# Patient Record
Sex: MALE | Race: WHITE | Employment: OTHER | URBAN - METROPOLITAN AREA
[De-identification: names, ages, dates, MRNs, and addresses within clinical notes are randomized per-mention and may not be internally consistent; named-entity substitution may affect disease eponyms.]

---

## 2021-10-11 ENCOUNTER — HOSPITAL ENCOUNTER (EMERGENCY)
Facility: CLINIC | Age: 76
Discharge: HOME | End: 2021-10-11
Attending: EMERGENCY MEDICINE | Admitting: EMERGENCY MEDICINE
Payer: MEDICARE

## 2021-10-11 VITALS
RESPIRATION RATE: 22 BRPM | DIASTOLIC BLOOD PRESSURE: 94 MMHG | BODY MASS INDEX: 24.91 KG/M2 | HEIGHT: 66 IN | TEMPERATURE: 97.1 F | SYSTOLIC BLOOD PRESSURE: 164 MMHG | HEART RATE: 80 BPM | OXYGEN SATURATION: 96 % | WEIGHT: 155 LBS

## 2021-10-11 DIAGNOSIS — R00.0 TACHYCARDIA: ICD-10-CM

## 2021-10-11 DIAGNOSIS — S41.111A LACERATION OF RIGHT UPPER EXTREMITY, INITIAL ENCOUNTER: ICD-10-CM

## 2021-10-11 LAB
ATRIAL RATE - MUSE: 120 BPM
DIASTOLIC BLOOD PRESSURE - MUSE: NORMAL MMHG
INTERPRETATION ECG - MUSE: NORMAL
P AXIS - MUSE: NORMAL DEGREES
PR INTERVAL - MUSE: NORMAL MS
QRS DURATION - MUSE: 158 MS
QT - MUSE: 352 MS
QTC - MUSE: 518 MS
R AXIS - MUSE: 12 DEGREES
SYSTOLIC BLOOD PRESSURE - MUSE: NORMAL MMHG
T AXIS - MUSE: 186 DEGREES
VENTRICULAR RATE- MUSE: 130 BPM

## 2021-10-11 PROCEDURE — 12001 RPR S/N/AX/GEN/TRNK 2.5CM/<: CPT

## 2021-10-11 PROCEDURE — 250N000009 HC RX 250: Performed by: EMERGENCY MEDICINE

## 2021-10-11 PROCEDURE — 93005 ELECTROCARDIOGRAM TRACING: CPT | Mod: 59

## 2021-10-11 PROCEDURE — 99283 EMERGENCY DEPT VISIT LOW MDM: CPT

## 2021-10-11 PROCEDURE — 250N000011 HC RX IP 250 OP 636: Performed by: EMERGENCY MEDICINE

## 2021-10-11 PROCEDURE — 271N000002 HC RX 271: Performed by: EMERGENCY MEDICINE

## 2021-10-11 RX ORDER — METHYLCELLULOSE 4000CPS 30 %
POWDER (GRAM) MISCELLANEOUS ONCE
Status: COMPLETED | OUTPATIENT
Start: 2021-10-11 | End: 2021-10-11

## 2021-10-11 RX ADMIN — Medication 150 MG: at 03:24

## 2021-10-11 RX ADMIN — EPINEPHRINE BITARTRATE 3 ML: 1 POWDER at 03:24

## 2021-10-11 ASSESSMENT — ENCOUNTER SYMPTOMS
PALPITATIONS: 1
WOUND: 1

## 2021-10-11 ASSESSMENT — MIFFLIN-ST. JEOR: SCORE: 1385.83

## 2021-10-11 NOTE — DISCHARGE INSTRUCTIONS
Your heart rate was much too fast in the emergency department today.  I am concerned that with your cardiac history allowing your heart rate to stay this fast could cause heart damage.  We do not know why your heart is beating this quickly right now and the cause could be something dangerous.  We strongly recommended that you stay in the hospital for further evaluation and testing.  Not treating your fast heart rate or the underlying cause could lead to permanent disability or death.  I strongly encourage you to return to the emergency department when you are able for further evaluation of your heart rate.  We are happy to see you at this emergency department at any time.    If you feel your condition has changed or worsened, please call your doctor or return to the emergency department right away.        Discharge Instructions  Laceration (Cut)    You were seen today for a laceration (cut).  Your provider examined your laceration for any problems such a buried foreign body (like glass, a splinter, or gravel), or injury to blood vessels, tendons, and nerves.  Your provider may have also rinsed and/or scrubbed your laceration to help prevent an infection. It may not be possible to find all problems with your laceration on the first visit; occasionally foreign bodies or a tendon injury can go undetected.    Your laceration may have been closed in one of several ways:  No closure: many wounds will heal just fine without closure.  Stitches: regular stitches that require removal.  Staples: skin staples are often used in the scalp/head.  Wound adhesive (glue): skin glue can be used for certain lacerations and doesn t require removal.  Wound strips (aka Butterfly bandages or steri-strips): these are bandages that help to close a wound.  Absorbable stitches:  dissolving  stitches that go away on their own and usually don t require removal.    A small percentage of wounds will develop an infection regardless of how well the  wound is cared for. Antibiotics are generally not indicated to prevent an infection so are only given for a small number of high-risk wounds. Some lacerations are too high risk to close, and are left open to heal because closure can increase the likelihood that an infection will develop.    Remember that all lacerations, no matter how expertly repaired, will cause scarring. We consider many factors, techniques, and materials, in our efforts to provide the best possible cosmetic outcome.    Generally, every Emergency Department visit should have a follow-up clinic visit with either a primary or a specialty clinic/provider. Please follow-up as instructed by your emergency provider today.     Return to the Emergency Department right away if:  You have more redness, swelling, pain, drainage (pus), a bad smell, or red streaking from your laceration as these symptoms could indicate an infection.  You have a fever of 100.4 F or more.  You have bleeding that you cannot stop at home. If your cut starts to bleed, hold pressure on the bleeding area with a clean cloth or put pressure over the bandage.  If the bleeding does not stop after using constant pressure for 30 minutes, you should return to the Emergency Department for further treatment.  An area past the laceration is cool, pale, or blue compared with the other side, or has a slower return of color when squeezed.  Your dressing seems too tight or starts to get uncomfortable or painful. For children, signs of a problem might be irritability or restlessness.  You have loss of normal function or use of an area, such as being unable to straighten or bend a finger normally.  You have a numb area past the laceration.    Return to the Emergency Department or see your regular provider if:  The laceration starts to come open.   You have something coming out of the cut or a feeling that there is something in the laceration.  Your wound will not heal, or keeps breaking open. There  can always be glass, wood, dirt or other things in any wound.  They will not always show up, even on x-rays.  If a wound does not heal, this may be why, and it is important to follow-up with your regular provider.    Home Care:  Take your dressing off in 12-24 hours, or as instructed by your provider, to check your laceration. Remove the dressing sooner if it seems too tight or painful, or if it is getting numb, tingly, or pale past the dressing.  Gently wash your laceration 1-2 times daily with clean water and mild soap. It is okay to shower or run clean water over the laceration, but do not let the laceration soak in water (no swimming).  If your laceration was closed with wound adhesive or strips: pat it dry and leave it open to the air. For all other repairs: after you wash your laceration, or at least 2 times a day, apply antibiotic ointment (such as Neosporin  or Bacitracin ) to the laceration, then cover it with a Band-Aid  or gauze.  Keep the laceration clean. Wear gloves or other protective clothing if you are around dirt.    Follow-up for removal:  If your wound was closed with staples or regular stitches, they need to be removed according to the instructions and timeline specified by your provider today.  If your wound was closed with absorbable ( dissolving ) sutures, they should fall out, dissolve, or not be visible in about one week. If they are still visible, then they should be removed according to the instructions and timeline specified by your provider today.    Scars:  To help minimize scarring:  Wear sunscreen over the healed laceration when out in the sun.  Massage the area regularly once healed.  You may apply Vitamin E to the healed wound.  Wait. Scars improve in appearance over months and years.    If you were given a prescription for medicine here today, be sure to read all of the information (including the package insert) that comes with your prescription.  This will include important  information about the medicine, its side effects, and any warnings that you need to know about.  The pharmacist who fills the prescription can provide more information and answer questions you may have about the medicine.  If you have questions or concerns that the pharmacist cannot address, please call or return to the Emergency Department.       Remember that you can always come back to the Emergency Department if you are not able to see your regular provider in the amount of time listed above, if you get any new symptoms, or if there is anything that worries you.  Discharge Instructions  Palpitations    Palpitations are an unusual awareness of your heartbeat. People often describe this as the heart skipping, fluttering, racing, irregular, or pounding. At this time, your provider has found no signs that your palpitations are due to a serious or life-threatening condition. However, sometimes there is a serious problem that does not show up right away.    Palpitations can be caused by caffeine, cigarettes, diet pills, energy drinks or supplements, other stimulants, and medications and street drugs. They can also be caused by anxiety, hormone conditions such as high thyroid, and other medical conditions. Sometimes they are a sign of abnormal rhythm in the heart. At this time, your provider did not find any dangerous cause of your symptoms.    Generally, every Emergency Department visit should have a follow-up clinic visit with either a primary or a specialty clinic/provider. Please follow-up as instructed by your emergency provider today.    Return to the Emergency Department if:  You get chest pain or tightness.  You are short of breath.  You get very weak or tired.  You pass out or faint.  Your heart rate is over 120 beats per minute for more than 10 minutes while you are resting.   You have anything else that worries you.    What can I do to help myself?  Fill any prescriptions the provider gave you and take them  right away.   Follow your provider s instructions about the prescription medicines you are on. Sometimes the provider may tell you to stop taking a medicine or change the dose.  If you smoke, this may be a good time to quit! The less you can smoke, the better.  Do not use energy drinks, diet pills, or stimulants. Limit your use of caffeine.  If you were given a prescription for medicine here today, be sure to read all of the information (including the package insert) that comes with your prescription.  This will include important information about the medicine, its side effects, and any warnings that you need to know about.  The pharmacist who fills the prescription can provide more information and answer questions you may have about the medicine.  If you have questions or concerns that the pharmacist cannot address, please call or return to the Emergency Department.     Remember that you can always come back to the Emergency Department if you are not able to see your regular provider in the amount of time listed above, if you get any new symptoms, or if there is anything that worries you.

## 2021-10-11 NOTE — ED TRIAGE NOTES
Patient states feel into a mirror.  Cut right arm.  States thinks he cut his artery.  Takes blood thinner.

## 2021-10-11 NOTE — ED NOTES
"Patient insisted on leaving AMA after receiving sutures. Declined blood test and further workup. \"I have to get home to my wife and my dog is having surgery today that we have waited two months for.\" Patient signed AMA form. Instructed to return to ED if his condition worsens in anyway.   "

## 2021-10-11 NOTE — ED PROVIDER NOTES
"  History   Chief Complaint:  Laceration       The history is provided by the patient.      Xavier Giordano is a 76 year old male with history of coronary artery disease who presents for evaluation of a right forearm laceration. Xavier lacerated his right forearm on his wife's metal nightstand prior to presentation. He is anticoagulated on Plavix for coronary artery disease. On my evaluation he is tachycardic to 130 and reports his heart rate is normally 60. No history of atrial fibrillation.     Review of Systems   Cardiovascular: Positive for palpitations.   Skin: Positive for wound.   10 systems reviewed and negative except as above and in HPI.     Allergies:  Amlodipine  Atenolol  Carvedilol  Ciprofloxacin  Metoprolol  Metronidazole  Morphine  Simvastatin    Medications:  Ascorbic acid  Aspirin 81 mg  Cholecalciferol   Gemfibrozil  Clopidogrel  Nitroglycerin  Isosorbide mononitrate  Lisinopril-hydrochlorothiazide    Past Medical History:     AAA  Chronic pain  Coronary artery disease  Cellulitis left lower extremity  Hyperlipidemia   Hypertension   PAD  Vitamin D deficiency    Past Surgical History:    CABG x4  Hernia repair x2  Coronary stent placemen  Arthroscopy knee surgery  Left toe amputation     Family History:    Diabetes    Social History:  Presents unaccompanied  Lives independently with his wife     Physical Exam     Patient Vitals for the past 24 hrs:   BP Temp Temp src Pulse Resp SpO2 Height Weight   10/11/21 0348 -- -- -- 80 -- -- -- --   10/11/21 0205 (!) 164/94 97.1  F (36.2  C) Temporal (!) 134 22 96 % 1.676 m (5' 6\") 70.3 kg (155 lb)       Physical Exam  General: Resting on the gurney, appears uncomfortable  Head:  The scalp, face, and head appear normal  Mouth/Throat: Mucus membranes are moist  CV:  Rapid but regular rate    Normal S1 and S2  No pathological murmur   Resp:  Breath sounds clear and equal bilaterally    Non-labored, no retractions or accessory muscle use    No coarseness    No " wheezing   GI:  Abdomen is soft, no rigidity    No tenderness to palpation  MS:  Normal motor assessment of all extremities.    Good capillary refill noted.  Skin:  2 cm V-shaped laceration to the right forearm.  Neuro:  Speech is normal and fluent. No apparent deficit.  Psych : Awake. Alert.  Normal affect.      Appropriate interactions.     Emergency Department Course   ECG  ECG obtained at 0427, ECG read at 0427  Wide QRS tachycardia. Left bundle branch block. Abnormal ECG.   No prior ECG for comparison.   Rate 130 bpm. AR interval * ms. QRS duration 158 ms. QT/QTc 352/518 ms. P-R-T axes * 12 186.       Procedures    Laceration Repair        LACERATION:  A simple clean 2 cm V-shaped laceration.      LOCATION:  Right forearm      FUNCTION:  Distally sensation, circulation, motor and tendon function are intact.      ANESTHESIA:  Local using 1% lidocaine w/o epi and LET - Topical      PREPARATION:  Irrigation with Normal Saline      DEBRIDEMENT:  no debridement      CLOSURE:  Wound was closed with One Layer.  Skin closed with 3 x 5.0 Ethylon using interrupted sutures.      Emergency Department Course:  Reviewed:  I reviewed nursing notes, vitals, past medical history and Care Everywhere    Assessments:  0415 I obtained history and examined the patient as noted above. He is insistent on discharge immediately.     Disposition:  The patient left AMA.     Impression & Plan     Medical Decision Making:  The patient presented with a laceration.  The wound was carefully evaluated and explored.  The laceration was closed with sutures as noted above.  There is no evidence of muscular, tendon, or bony damage with this laceration.  No signs of foreign body.    He was found to be tachycardic while here in the emergency department.  Etiology for his tachycardia is not clear.  I strongly recommended further assessment which the patient adamantly refused.  I recommended at a minimum obtaining i-STAT labs to evaluate for anemia or  elevated troponin.  Patient again refused these.  He insisted on immediate discharge.  I discussed at length that he could have death or permanent disability and that following up at a later date could cause permanent harm or death.  He is insistent on discharge and is discharged AGAINST MEDICAL ADVICE per his request.   Follow up with primary care will be indicated for suture removal as noted in the discharge section.       Diagnosis:    ICD-10-CM    1. Tachycardia  R00.0    2. Laceration of right upper extremity, initial encounter  S41.111A        Scribe Disclosure:  Jennifer CADENA, am serving as a scribe at 4:15 AM on 10/11/2021 to document services personally performed by Terra Cunningham MD based on my observations and the provider's statements to me.              Terra Cunningham MD  10/11/21 0368

## 2021-10-19 ENCOUNTER — TRANSCRIBE ORDERS (OUTPATIENT)
Dept: OTHER | Age: 76
End: 2021-10-19

## 2021-10-19 DIAGNOSIS — Z98.61 POST PTCA: ICD-10-CM

## 2021-10-19 DIAGNOSIS — I24.9 ACS (ACUTE CORONARY SYNDROME) (H): Primary | ICD-10-CM

## 2021-10-24 ENCOUNTER — APPOINTMENT (OUTPATIENT)
Dept: GENERAL RADIOLOGY | Facility: CLINIC | Age: 76
DRG: 280 | End: 2021-10-24
Attending: EMERGENCY MEDICINE
Payer: MEDICARE

## 2021-10-24 ENCOUNTER — HOSPITAL ENCOUNTER (INPATIENT)
Facility: CLINIC | Age: 76
LOS: 4 days | Discharge: HOME | DRG: 280 | End: 2021-10-28
Attending: EMERGENCY MEDICINE | Admitting: INTERNAL MEDICINE
Payer: MEDICARE

## 2021-10-24 DIAGNOSIS — E87.6 HYPOKALEMIA: ICD-10-CM

## 2021-10-24 DIAGNOSIS — I21.4 NSTEMI (NON-ST ELEVATED MYOCARDIAL INFARCTION) (H): Primary | ICD-10-CM

## 2021-10-24 DIAGNOSIS — N28.9 RENAL INSUFFICIENCY: ICD-10-CM

## 2021-10-24 DIAGNOSIS — R60.0 PERIPHERAL EDEMA: ICD-10-CM

## 2021-10-24 DIAGNOSIS — R79.89 ELEVATED TROPONIN: ICD-10-CM

## 2021-10-24 DIAGNOSIS — I50.31 ACUTE DIASTOLIC HEART FAILURE (H): ICD-10-CM

## 2021-10-24 DIAGNOSIS — I47.29 PAROXYSMAL VENTRICULAR TACHYCARDIA (H): ICD-10-CM

## 2021-10-24 LAB
ALBUMIN SERPL-MCNC: 3.2 G/DL (ref 3.4–5)
ALP SERPL-CCNC: 135 U/L (ref 40–150)
ALT SERPL W P-5'-P-CCNC: 83 U/L (ref 0–70)
ANION GAP SERPL CALCULATED.3IONS-SCNC: 10 MMOL/L (ref 3–14)
AST SERPL W P-5'-P-CCNC: 57 U/L (ref 0–45)
ATRIAL RATE - MUSE: 76 BPM
ATRIAL RATE - MUSE: 96 BPM
BASOPHILS # BLD AUTO: 0.1 10E3/UL (ref 0–0.2)
BASOPHILS NFR BLD AUTO: 1 %
BILIRUB SERPL-MCNC: 0.9 MG/DL (ref 0.2–1.3)
BUN SERPL-MCNC: 43 MG/DL (ref 7–30)
CALCIUM SERPL-MCNC: 8.4 MG/DL (ref 8.5–10.1)
CHLORIDE BLD-SCNC: 101 MMOL/L (ref 94–109)
CO2 SERPL-SCNC: 21 MMOL/L (ref 20–32)
CREAT SERPL-MCNC: 2.04 MG/DL (ref 0.66–1.25)
DIASTOLIC BLOOD PRESSURE - MUSE: NORMAL MMHG
DIASTOLIC BLOOD PRESSURE - MUSE: NORMAL MMHG
EOSINOPHIL # BLD AUTO: 0.1 10E3/UL (ref 0–0.7)
EOSINOPHIL NFR BLD AUTO: 1 %
ERYTHROCYTE [DISTWIDTH] IN BLOOD BY AUTOMATED COUNT: 15.3 % (ref 10–15)
GFR SERPL CREATININE-BSD FRML MDRD: 31 ML/MIN/1.73M2
GLUCOSE BLD-MCNC: 122 MG/DL (ref 70–99)
HCT VFR BLD AUTO: 26 % (ref 40–53)
HGB BLD-MCNC: 8.5 G/DL (ref 13.3–17.7)
HOLD SPECIMEN: NORMAL
IMM GRANULOCYTES # BLD: 0.1 10E3/UL
IMM GRANULOCYTES NFR BLD: 1 %
INR PPP: 1.32 (ref 0.85–1.15)
INTERPRETATION ECG - MUSE: NORMAL
INTERPRETATION ECG - MUSE: NORMAL
LYMPHOCYTES # BLD AUTO: 1.4 10E3/UL (ref 0.8–5.3)
LYMPHOCYTES NFR BLD AUTO: 15 %
MAGNESIUM SERPL-MCNC: 2.4 MG/DL (ref 1.6–2.3)
MAGNESIUM SERPL-MCNC: 3 MG/DL (ref 1.6–2.3)
MCH RBC QN AUTO: 30.6 PG (ref 26.5–33)
MCHC RBC AUTO-ENTMCNC: 32.7 G/DL (ref 31.5–36.5)
MCV RBC AUTO: 94 FL (ref 78–100)
MONOCYTES # BLD AUTO: 0.9 10E3/UL (ref 0–1.3)
MONOCYTES NFR BLD AUTO: 9 %
NEUTROPHILS # BLD AUTO: 6.9 10E3/UL (ref 1.6–8.3)
NEUTROPHILS NFR BLD AUTO: 73 %
NRBC # BLD AUTO: 0 10E3/UL
NRBC BLD AUTO-RTO: 0 /100
NT-PROBNP SERPL-MCNC: ABNORMAL PG/ML (ref 0–900)
P AXIS - MUSE: NORMAL DEGREES
P AXIS - MUSE: NORMAL DEGREES
PLATELET # BLD AUTO: 272 10E3/UL (ref 150–450)
POTASSIUM BLD-SCNC: 3.3 MMOL/L (ref 3.4–5.3)
POTASSIUM BLD-SCNC: 4.8 MMOL/L (ref 3.4–5.3)
PR INTERVAL - MUSE: NORMAL MS
PR INTERVAL - MUSE: NORMAL MS
PROT SERPL-MCNC: 7.3 G/DL (ref 6.8–8.8)
QRS DURATION - MUSE: 142 MS
QRS DURATION - MUSE: 152 MS
QT - MUSE: 356 MS
QT - MUSE: 384 MS
QTC - MUSE: 526 MS
QTC - MUSE: 573 MS
R AXIS - MUSE: 262 DEGREES
R AXIS - MUSE: 28 DEGREES
RBC # BLD AUTO: 2.78 10E6/UL (ref 4.4–5.9)
SARS-COV-2 RNA RESP QL NAA+PROBE: NEGATIVE
SODIUM SERPL-SCNC: 132 MMOL/L (ref 133–144)
SYSTOLIC BLOOD PRESSURE - MUSE: NORMAL MMHG
SYSTOLIC BLOOD PRESSURE - MUSE: NORMAL MMHG
T AXIS - MUSE: 179 DEGREES
T AXIS - MUSE: 74 DEGREES
TROPONIN I SERPL-MCNC: 0.23 UG/L (ref 0–0.04)
TROPONIN I SERPL-MCNC: 1.93 UG/L (ref 0–0.04)
TROPONIN I SERPL-MCNC: 3.46 UG/L (ref 0–0.04)
TSH SERPL DL<=0.005 MIU/L-ACNC: 1.66 MU/L (ref 0.4–4)
UFH PPP CHRO-ACNC: 0.45 IU/ML
VENTRICULAR RATE- MUSE: 113 BPM
VENTRICULAR RATE- MUSE: 156 BPM
WBC # BLD AUTO: 9.5 10E3/UL (ref 4–11)

## 2021-10-24 PROCEDURE — C9803 HOPD COVID-19 SPEC COLLECT: HCPCS

## 2021-10-24 PROCEDURE — 258N000003 HC RX IP 258 OP 636: Performed by: INTERNAL MEDICINE

## 2021-10-24 PROCEDURE — 96368 THER/DIAG CONCURRENT INF: CPT

## 2021-10-24 PROCEDURE — 36415 COLL VENOUS BLD VENIPUNCTURE: CPT | Performed by: EMERGENCY MEDICINE

## 2021-10-24 PROCEDURE — 85025 COMPLETE CBC W/AUTO DIFF WBC: CPT | Performed by: EMERGENCY MEDICINE

## 2021-10-24 PROCEDURE — 87635 SARS-COV-2 COVID-19 AMP PRB: CPT | Performed by: EMERGENCY MEDICINE

## 2021-10-24 PROCEDURE — 83735 ASSAY OF MAGNESIUM: CPT | Performed by: INTERNAL MEDICINE

## 2021-10-24 PROCEDURE — 210N000002 HC R&B HEART CARE

## 2021-10-24 PROCEDURE — 83735 ASSAY OF MAGNESIUM: CPT | Performed by: EMERGENCY MEDICINE

## 2021-10-24 PROCEDURE — 99285 EMERGENCY DEPT VISIT HI MDM: CPT | Mod: 25

## 2021-10-24 PROCEDURE — 96365 THER/PROPH/DIAG IV INF INIT: CPT

## 2021-10-24 PROCEDURE — 99223 1ST HOSP IP/OBS HIGH 75: CPT | Mod: AI | Performed by: INTERNAL MEDICINE

## 2021-10-24 PROCEDURE — 250N000011 HC RX IP 250 OP 636: Performed by: INTERNAL MEDICINE

## 2021-10-24 PROCEDURE — 93005 ELECTROCARDIOGRAM TRACING: CPT

## 2021-10-24 PROCEDURE — 93005 ELECTROCARDIOGRAM TRACING: CPT | Mod: 76

## 2021-10-24 PROCEDURE — 250N000013 HC RX MED GY IP 250 OP 250 PS 637: Performed by: INTERNAL MEDICINE

## 2021-10-24 PROCEDURE — 36415 COLL VENOUS BLD VENIPUNCTURE: CPT | Performed by: INTERNAL MEDICINE

## 2021-10-24 PROCEDURE — 71045 X-RAY EXAM CHEST 1 VIEW: CPT

## 2021-10-24 PROCEDURE — 96366 THER/PROPH/DIAG IV INF ADDON: CPT

## 2021-10-24 PROCEDURE — 83880 ASSAY OF NATRIURETIC PEPTIDE: CPT | Performed by: EMERGENCY MEDICINE

## 2021-10-24 PROCEDURE — 84132 ASSAY OF SERUM POTASSIUM: CPT | Performed by: INTERNAL MEDICINE

## 2021-10-24 PROCEDURE — 96376 TX/PRO/DX INJ SAME DRUG ADON: CPT

## 2021-10-24 PROCEDURE — 84484 ASSAY OF TROPONIN QUANT: CPT | Performed by: INTERNAL MEDICINE

## 2021-10-24 PROCEDURE — 250N000011 HC RX IP 250 OP 636: Performed by: EMERGENCY MEDICINE

## 2021-10-24 PROCEDURE — 258N000003 HC RX IP 258 OP 636: Performed by: EMERGENCY MEDICINE

## 2021-10-24 PROCEDURE — 96367 TX/PROPH/DG ADDL SEQ IV INF: CPT

## 2021-10-24 PROCEDURE — 85520 HEPARIN ASSAY: CPT | Performed by: INTERNAL MEDICINE

## 2021-10-24 PROCEDURE — 84484 ASSAY OF TROPONIN QUANT: CPT | Performed by: EMERGENCY MEDICINE

## 2021-10-24 PROCEDURE — 96375 TX/PRO/DX INJ NEW DRUG ADDON: CPT

## 2021-10-24 PROCEDURE — 250N000013 HC RX MED GY IP 250 OP 250 PS 637: Performed by: EMERGENCY MEDICINE

## 2021-10-24 PROCEDURE — 84443 ASSAY THYROID STIM HORMONE: CPT | Performed by: EMERGENCY MEDICINE

## 2021-10-24 PROCEDURE — 80053 COMPREHEN METABOLIC PANEL: CPT | Performed by: EMERGENCY MEDICINE

## 2021-10-24 PROCEDURE — 99223 1ST HOSP IP/OBS HIGH 75: CPT | Performed by: INTERNAL MEDICINE

## 2021-10-24 PROCEDURE — 85610 PROTHROMBIN TIME: CPT | Performed by: EMERGENCY MEDICINE

## 2021-10-24 RX ORDER — PANTOPRAZOLE SODIUM 40 MG/1
40 TABLET, DELAYED RELEASE ORAL 2 TIMES DAILY
Status: DISCONTINUED | OUTPATIENT
Start: 2021-10-24 | End: 2021-10-28 | Stop reason: HOSPADM

## 2021-10-24 RX ORDER — PANTOPRAZOLE SODIUM 40 MG/1
40 TABLET, DELAYED RELEASE ORAL 2 TIMES DAILY
COMMUNITY

## 2021-10-24 RX ORDER — AMOXICILLIN 250 MG
2 CAPSULE ORAL 2 TIMES DAILY PRN
Status: DISCONTINUED | OUTPATIENT
Start: 2021-10-24 | End: 2021-10-28 | Stop reason: HOSPADM

## 2021-10-24 RX ORDER — PROCHLORPERAZINE 25 MG
12.5 SUPPOSITORY, RECTAL RECTAL EVERY 12 HOURS PRN
Status: DISCONTINUED | OUTPATIENT
Start: 2021-10-24 | End: 2021-10-28 | Stop reason: HOSPADM

## 2021-10-24 RX ORDER — AMOXICILLIN 250 MG
1 CAPSULE ORAL 2 TIMES DAILY PRN
Status: DISCONTINUED | OUTPATIENT
Start: 2021-10-24 | End: 2021-10-28 | Stop reason: HOSPADM

## 2021-10-24 RX ORDER — BISACODYL 10 MG
10 SUPPOSITORY, RECTAL RECTAL DAILY PRN
Status: DISCONTINUED | OUTPATIENT
Start: 2021-10-24 | End: 2021-10-28 | Stop reason: HOSPADM

## 2021-10-24 RX ORDER — FAMOTIDINE 20 MG
2000 TABLET ORAL DAILY
COMMUNITY

## 2021-10-24 RX ORDER — FUROSEMIDE 10 MG/ML
80 INJECTION INTRAMUSCULAR; INTRAVENOUS EVERY 8 HOURS
Status: DISCONTINUED | OUTPATIENT
Start: 2021-10-24 | End: 2021-10-25

## 2021-10-24 RX ORDER — ACETAMINOPHEN 325 MG/1
650 TABLET ORAL EVERY 6 HOURS PRN
Status: DISCONTINUED | OUTPATIENT
Start: 2021-10-24 | End: 2021-10-28 | Stop reason: HOSPADM

## 2021-10-24 RX ORDER — POTASSIUM CHLORIDE 7.45 MG/ML
10 INJECTION INTRAVENOUS ONCE
Status: DISCONTINUED | OUTPATIENT
Start: 2021-10-24 | End: 2021-10-24

## 2021-10-24 RX ORDER — HEPARIN SODIUM 10000 [USP'U]/100ML
0-5000 INJECTION, SOLUTION INTRAVENOUS CONTINUOUS
Status: DISCONTINUED | OUTPATIENT
Start: 2021-10-24 | End: 2021-10-25

## 2021-10-24 RX ORDER — ACETAMINOPHEN 650 MG/1
650 SUPPOSITORY RECTAL EVERY 6 HOURS PRN
Status: DISCONTINUED | OUTPATIENT
Start: 2021-10-24 | End: 2021-10-28 | Stop reason: HOSPADM

## 2021-10-24 RX ORDER — MAGNESIUM SULFATE HEPTAHYDRATE 40 MG/ML
2 INJECTION, SOLUTION INTRAVENOUS ONCE
Status: COMPLETED | OUTPATIENT
Start: 2021-10-24 | End: 2021-10-24

## 2021-10-24 RX ORDER — MULTIPLE VITAMINS W/ MINERALS TAB 9MG-400MCG
1 TAB ORAL DAILY
COMMUNITY

## 2021-10-24 RX ORDER — METOPROLOL SUCCINATE 50 MG/1
50 TABLET, EXTENDED RELEASE ORAL 2 TIMES DAILY
COMMUNITY

## 2021-10-24 RX ORDER — FUROSEMIDE 10 MG/ML
80 INJECTION INTRAMUSCULAR; INTRAVENOUS EVERY 8 HOURS
Status: DISCONTINUED | OUTPATIENT
Start: 2021-10-24 | End: 2021-10-24

## 2021-10-24 RX ORDER — ASPIRIN 81 MG/1
81 TABLET ORAL DAILY
COMMUNITY

## 2021-10-24 RX ORDER — ROSUVASTATIN CALCIUM 40 MG/1
40 TABLET, COATED ORAL AT BEDTIME
Status: ON HOLD | COMMUNITY
End: 2021-10-28

## 2021-10-24 RX ORDER — POLYETHYLENE GLYCOL 3350 17 G/17G
17 POWDER, FOR SOLUTION ORAL DAILY PRN
Status: DISCONTINUED | OUTPATIENT
Start: 2021-10-24 | End: 2021-10-28 | Stop reason: HOSPADM

## 2021-10-24 RX ORDER — ACETAMINOPHEN 325 MG/1
650 TABLET ORAL EVERY 4 HOURS PRN
COMMUNITY

## 2021-10-24 RX ORDER — HYDRALAZINE HYDROCHLORIDE 20 MG/ML
10 INJECTION INTRAMUSCULAR; INTRAVENOUS EVERY 4 HOURS PRN
Status: DISCONTINUED | OUTPATIENT
Start: 2021-10-24 | End: 2021-10-28 | Stop reason: HOSPADM

## 2021-10-24 RX ORDER — ONDANSETRON 4 MG/1
4 TABLET, ORALLY DISINTEGRATING ORAL EVERY 6 HOURS PRN
Status: DISCONTINUED | OUTPATIENT
Start: 2021-10-24 | End: 2021-10-28 | Stop reason: HOSPADM

## 2021-10-24 RX ORDER — HEPARIN SODIUM 10000 [USP'U]/100ML
0-5000 INJECTION, SOLUTION INTRAVENOUS CONTINUOUS
Status: DISCONTINUED | OUTPATIENT
Start: 2021-10-24 | End: 2021-10-24

## 2021-10-24 RX ORDER — FUROSEMIDE 40 MG
40 TABLET ORAL 2 TIMES DAILY
COMMUNITY

## 2021-10-24 RX ORDER — FUROSEMIDE 10 MG/ML
40 INJECTION INTRAMUSCULAR; INTRAVENOUS ONCE
Status: COMPLETED | OUTPATIENT
Start: 2021-10-24 | End: 2021-10-24

## 2021-10-24 RX ORDER — NITROGLYCERIN 0.4 MG/1
0.4 TABLET SUBLINGUAL EVERY 5 MIN PRN
COMMUNITY

## 2021-10-24 RX ORDER — CLOPIDOGREL BISULFATE 75 MG/1
75 TABLET ORAL DAILY
Status: DISCONTINUED | OUTPATIENT
Start: 2021-10-25 | End: 2021-10-28 | Stop reason: HOSPADM

## 2021-10-24 RX ORDER — ROSUVASTATIN CALCIUM 20 MG/1
40 TABLET, COATED ORAL AT BEDTIME
Status: DISCONTINUED | OUTPATIENT
Start: 2021-10-24 | End: 2021-10-28 | Stop reason: HOSPADM

## 2021-10-24 RX ORDER — ISOSORBIDE MONONITRATE 30 MG/1
30 TABLET, EXTENDED RELEASE ORAL AT BEDTIME
Status: DISCONTINUED | OUTPATIENT
Start: 2021-10-24 | End: 2021-10-28 | Stop reason: HOSPADM

## 2021-10-24 RX ORDER — CLOPIDOGREL BISULFATE 75 MG/1
75 TABLET ORAL DAILY
COMMUNITY

## 2021-10-24 RX ORDER — MULTIVIT WITH MINERALS/LUTEIN
1000 TABLET ORAL DAILY
COMMUNITY

## 2021-10-24 RX ORDER — ISOSORBIDE MONONITRATE 60 MG/1
30 TABLET, EXTENDED RELEASE ORAL AT BEDTIME
COMMUNITY

## 2021-10-24 RX ORDER — LIDOCAINE 40 MG/G
CREAM TOPICAL
Status: DISCONTINUED | OUTPATIENT
Start: 2021-10-24 | End: 2021-10-28 | Stop reason: HOSPADM

## 2021-10-24 RX ORDER — ASPIRIN 81 MG/1
81 TABLET ORAL DAILY
Status: DISCONTINUED | OUTPATIENT
Start: 2021-10-25 | End: 2021-10-28 | Stop reason: HOSPADM

## 2021-10-24 RX ORDER — ONDANSETRON 2 MG/ML
4 INJECTION INTRAMUSCULAR; INTRAVENOUS EVERY 6 HOURS PRN
Status: DISCONTINUED | OUTPATIENT
Start: 2021-10-24 | End: 2021-10-28 | Stop reason: HOSPADM

## 2021-10-24 RX ORDER — PROCHLORPERAZINE MALEATE 5 MG
5 TABLET ORAL EVERY 6 HOURS PRN
Status: DISCONTINUED | OUTPATIENT
Start: 2021-10-24 | End: 2021-10-28 | Stop reason: HOSPADM

## 2021-10-24 RX ORDER — POTASSIUM CHLORIDE 1.5 G/1.58G
40 POWDER, FOR SOLUTION ORAL ONCE
Status: COMPLETED | OUTPATIENT
Start: 2021-10-24 | End: 2021-10-24

## 2021-10-24 RX ORDER — METOPROLOL SUCCINATE 50 MG/1
50 TABLET, EXTENDED RELEASE ORAL 2 TIMES DAILY
Status: DISCONTINUED | OUTPATIENT
Start: 2021-10-24 | End: 2021-10-28 | Stop reason: HOSPADM

## 2021-10-24 RX ADMIN — Medication 1 MG: at 23:51

## 2021-10-24 RX ADMIN — AMIODARONE HYDROCHLORIDE 1 MG/MIN: 50 INJECTION, SOLUTION INTRAVENOUS at 11:08

## 2021-10-24 RX ADMIN — DEXTROSE MONOHYDRATE 50 MG: 50 INJECTION, SOLUTION INTRAVENOUS at 17:59

## 2021-10-24 RX ADMIN — AMIODARONE HYDROCHLORIDE 150 MG: 1.5 INJECTION, SOLUTION INTRAVENOUS at 10:36

## 2021-10-24 RX ADMIN — METOPROLOL SUCCINATE 50 MG: 50 TABLET, EXTENDED RELEASE ORAL at 20:02

## 2021-10-24 RX ADMIN — POTASSIUM CHLORIDE 10 MEQ: 7.46 INJECTION, SOLUTION INTRAVENOUS at 12:07

## 2021-10-24 RX ADMIN — PANTOPRAZOLE SODIUM 40 MG: 40 TABLET, DELAYED RELEASE ORAL at 20:02

## 2021-10-24 RX ADMIN — FUROSEMIDE 40 MG: 10 INJECTION, SOLUTION INTRAVENOUS at 13:12

## 2021-10-24 RX ADMIN — FUROSEMIDE 80 MG: 10 INJECTION, SOLUTION INTRAVENOUS at 18:03

## 2021-10-24 RX ADMIN — ISOSORBIDE MONONITRATE 30 MG: 30 TABLET, EXTENDED RELEASE ORAL at 21:07

## 2021-10-24 RX ADMIN — HEPARIN SODIUM 850 UNITS/HR: 10000 INJECTION, SOLUTION INTRAVENOUS at 13:15

## 2021-10-24 RX ADMIN — POTASSIUM CHLORIDE 40 MEQ: 1.5 POWDER, FOR SOLUTION ORAL at 12:05

## 2021-10-24 RX ADMIN — ROSUVASTATIN CALCIUM 40 MG: 20 TABLET, FILM COATED ORAL at 21:07

## 2021-10-24 RX ADMIN — MAGNESIUM SULFATE HEPTAHYDRATE 2 G: 40 INJECTION, SOLUTION INTRAVENOUS at 11:11

## 2021-10-24 ASSESSMENT — ACTIVITIES OF DAILY LIVING (ADL)
ADLS_ACUITY_SCORE: 10
ADLS_ACUITY_SCORE: 6
ADLS_ACUITY_SCORE: 10
ADLS_ACUITY_SCORE: 8
ADLS_ACUITY_SCORE: 10
ADLS_ACUITY_SCORE: 6
ADLS_ACUITY_SCORE: 8

## 2021-10-24 ASSESSMENT — MIFFLIN-ST. JEOR: SCORE: 1407.6

## 2021-10-24 ASSESSMENT — ENCOUNTER SYMPTOMS: SHORTNESS OF BREATH: 1

## 2021-10-24 NOTE — ED NOTES
Bed: ST02  Expected date:   Expected time:   Means of arrival:   Comments:  443  32 m v-tach  1016

## 2021-10-24 NOTE — ED PROVIDER NOTES
History   Chief Complaint:  Chest pain and Shortness of Breath     The history is provided by the patient and the EMS personnel.      Xavier Giordano is a 76 year old male with history of CAD, NSTEMI, ACS and recent coronary stent placement and aspiration thrombectomy and angioplasty, currently on Plavix, who presents alone via EMS for evaluation of chest pain and shortness of breath with exertion that started this morning. Per EMS, patient had stents placed at [facility] on the 11th and since then, he has had ongoing leg swelling. He was seen by his primary two days ago and placed on direutics. He was experiencing chest pain this morning, prompting his call to EMS.    On EMS arrival, patient was vitally stable with blood pressure of 118/76, though EKG showed possible VTACH.     Here, patient also endorses some shortness of breath with exertion.  He states due to the swelling, he has had a difficult time sleeping. However, this morning, patient had chest pain and took a dose of nitroglycerin with no relief, thus EMS was called.    Per chart review, patient was being evaluated at [facility] on 10/11 for a laceration and found to have a wide complex tachycardic rhythm with a heart rate of 130. Further workup was recommended at that time, but patient wanted to be seen at [facility] and signed AMA. He went over to [facility] that same day and found to have an NSTEMI and was admitted with a positive troponin with runs of non-sustained tachycardia. He underwent an angiogram, results below, and was found to have CHF and placed on furosemide. He also received one unit of PRBCs for a hemoglobin on 7.6 and noted melena. Patient was not discharged on lasix due to rising creatinine (1.6), but when he had his outpatient follow up after discharge, patient was found to have leg edema and shortness of breath while laying flat. He was placed on 20 mg lasix twice daily. His follow up in cardiology increased this to 40 mg twice  a day, but patient continued to have difficulty sleeping due to edema and inability to lay flat while sleeping.     Angiogram from 10/11/21  Summary/Conclusions   PRESENTATION / INDICATIONS   * NonSTEMI   VASCULAR ACCESS   * Using ultrasound guidance and a percutaneous technique, the right common femoral artery was accessed. Ultrasound was used to confirm vessel patency, localizing needle into the lumen of the vessel. An image was saved for the medical record.   DIAGNOSTIC - CORONARY   * Multivessel coronary disease (100% proximal RCA, 100% proximal LAD, 100% mid LAD, 90% proximal  circumflex, 100% OM1)   DIAGNOSTIC - GRAFTS   * The LIMA to the LAD is patent   * 90% stenosis in the mid body of the SVG graft to the 1st diagonal   * Acute total occlusion in the proximal anastomosis of the SVG graft to the 1st obtuse marginal   INTERVENTION   * Successful aspiration thrombectomy and angioplasty of the saphenous vein graft to the 1st obtuse marginal       Review of Systems   Respiratory: Positive for shortness of breath.    Cardiovascular: Positive for chest pain.   All other systems reviewed and are negative.      Allergies:  Amlodipine  Atenolol  Carvedilol  Ciprofloxacin  Metoprolol  Metronidazole  Morphine  Simvastatin    Medications:  Aspirin 81 mg  Plavix  Nitrostat  Metoprolol  Crestor  Protonix  Imdur  Lasix    Past Medical History:     Pedal edema  NSTEMI  CAD  Hyperlipidemia  Hypertension  Abdominal aortic aneurysm    Peripheral artery disease  Chronic pain  SIADH  Hyponatremia  ACS    Past Surgical History:    CABG x4  Hernia repair x2  Coronary stent placement  Arthroscopic knee surgery   Basal cell cancer removal   Toe amputation  Aspiration thrombectomy and angioplasty    Social History:  The patient arrives to the ED via EMS.   He is a former smokert.     Physical Exam     Patient Vitals for the past 24 hrs:   BP Temp Temp src Pulse Resp SpO2   10/24/21 1100 112/78 -- -- 114 17 97 %   10/24/21 1045 93/80  -- -- 99 26 96 %   10/24/21 1035 120/81 -- -- (!) 154 28 97 %   10/24/21 1025 115/80 98.7  F (37.1  C) Temporal (!) 156 24 96 %       Physical Exam  Nursing note and vitals reviewed.  Constitutional:  Appears well-developed and well-nourished.   HENT:   Head:    Atraumatic.   Mouth/Throat:   Oropharynx is clear and moist. No oropharyngeal exudate.   Eyes:    Pupils are equal, round, and reactive to light.   Neck:    Normal range of motion. Neck supple.      No tracheal deviation present. No thyromegaly present.   Cardiovascular:  No murmur. Tachycardiac rate, regular rhythm.   Pulmonary/Chest: Breath sounds are clear and equal without wheezes or crackles.  Abdominal:   Soft. Bowel sounds are normal. Exhibits no distension and      no mass. There is no tenderness.      There is no rebound and no guarding.   Musculoskeletal:  Exhibits no edema.   Lymphadenopathy:  No cervical adenopathy.   Neurological:   Alert and oriented to person, place, and time.   Skin:    Skin is warm and dry. No rash noted. Slightly pale.     Emergency Department Course   ECG #1:  ECG obtained at 1023, ECG read at 1025 by Dr. Cortes  Ventricular tachycardia   Wide QRS tachycardia with occasional premature ventricular complexes  Right bundle branch block  Abnormal ECG  No prior EKG for comparison.  Rate 156 bpm. NM interval * ms. QRS duration 152 ms. QT/QTc 356/573 ms. P-R-T axes * 262 74.       ECG #2:  ECG obtained at 1054, ECG read at 1057 by Dr. Cortes.  Wide QRS rhythm with premature supraventricular complexes and frequent premature ventricular complexes  Left bundle branch block  Abnormal ECG  As compared to EKG dated 10/24/21 at 1023   Rate 113 bpm. NM interval *. QRS duration 142. QT/QTc 384/526. P-R-T axes * 28 179.    Imaging:  XR Chest Port:   IMPRESSION: Artifact and overlying material mildly compromise exam.     Mild interstitial prominence could be from vascular congestion, though ultimately nonspecific. Mild airway thickening.  No focal consolidation or pneumonia.     Upper normal heart size, exaggerated by technique. Sternotomy. CABG. Aortic atherosclerosis.     No pleural effusion or pneumothorax.   Report per radiology    Laboratory:  CBC: HGB 8.5 (L) o/w WNL (WBC 9.5, )    CMP:  (L), Potassium 3.3 (L), Urea Nitrogen 42 (H), Creatinine 2.04 (H), Calcium 8.4 (L), Glucose 122 (H), Ast 57 (H), Alt 83 (H), Albumin 3.2 (L), GFR Estimate 31 (L) o/w WNL    Troponin (Collected 1038): 0.228 (HH)    BNP: 19,795 (H)    Magnesium: 2.4 (H)    INR: 1.32 (H)    TSH with free T4 reflex: 1.66    Asymptomatic COVID-19 (Coronavirus) PCR by Nasopharyngeal Swab: Negative      Emergency Department Course:    1017 EMS arrived. History obtained as noted above in HPI.     1019 I performed a physical exam as noted above. Additional history obtained from patient, as noted above.     1028 I spoke with Dr. Agudelo of the Cardiology service regarding patient's presentation, findings, and plan of care.     1030 I rechecked patient.     1041 I rechecked patient.     1047 I rechecked patient. Patient appeared to converted to a narrow complex tachycardia with ventricular rate of 115.     1216 I spoke with Dr. Nye of the Hospitalist service regarding patient's presentation, findings, and plan of care, who agrees to accept patient for further care, evaluation and monitoring.      Interventions:  1036 Nextrone 150 mg IV  1108 Amiodarone 1 mg/min IV  1111 Magnesium sulfate 2 g IV   1205 Potassium chloride 40 mEq PO  1207 Potassium chloride 10 mEq IV initiated, but stopped due to intolerable pain at the IV side  1312 Lasix 40 mg IV  1314 Heparin loading dose 4200 units IV  1315 Heparin 25,000 units in 0/45% NaCl 250 mL 850 units/hr IV     Disposition:  The patient was admitted to the hospital under the care of Dr. Nye.     Impression & Plan     Medical Decision Making:  I found the patient to have stable ventricular tachycardia and he cardioverted after  amiodarone bolus and drip IV. He was also found to have hypokalemia so he was given potassium supplementation here. He had recent stent placement and has an elevated troponin so he is placed on heparin and admitted to the cardiac telemetry for further cardiac workup. There would be concern for a stent occlusion. He has no ECG evidence of ST elevation or MI at this time, although his Troponin is elevated, so he was given IV heparin bolus and drip per cardiac potocol. He also has CHF with fluid overload so he was given Lasix IV. He was also found to have stable anemia with an unchanged hemoglobin from earlier this month, a Creatinine which is elevated from his baseline, and mild hypokalemia.  He was given Lasix IV and supplemental Potassium.  He was admitted to Dr. Nye to the cardiac telemetry.     Critical Care Time: was 45 minutes for this patient excluding procedures    Diagnosis:    ICD-10-CM    1. Paroxysmal ventricular tachycardia (H)  I47.2    2. Acute diastolic heart failure (H)  I50.31    3. Peripheral edema  R60.9    4. Elevated troponin  R77.8    5. Hypokalemia  E87.6    6. Renal insufficiency  N28.9        Scribe Disclosure:  Meredith CADENA, am serving as a scribe at 10:23 AM on 10/24/2021 to document services personally performed by Poly Cortes MD based on my observations and the provider's statements to me.            Poly Cortes MD  10/24/21 2533

## 2021-10-24 NOTE — ED TRIAGE NOTES
Patient called EMs this morning for chest pain. When they arrived patient was in a regular appearing tachycardia. Atrial tach with wide complex vs VT. Patient arrived was placed on full cardiac monitors including defibrillator as precaution. Patient rating chest pain 6/10.

## 2021-10-24 NOTE — H&P
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     Paroxysmal ventricular tachycardia  NSTEMI  Acute on chronic diastolic congestive heart failure exacerbation (HFpEF)  Coronary artery disease, multivessel, with history of CABG (LIMA to LAD, SVG to 1st diagonal, SVG to OM1) s/p thrombectomy and angioplasty SVG to OM1 10/11/21, ART to SVG to OM1 10/14/21  Hypertension  Dyslipidemia   *Presents with progressive lower extremity edema, orthopnea, dyspnea on exertion.   *CXR with mild interstitial prominence suggestive of CHF, clinically consistent with symptoms  *Recently admitted 10/11-10/16/21 at [facility], with angiogram x2 and stent placement as above  *Diuresed for HFpEF during that admission, though diuretics held due to creatinine rise (though occurring following angiogram, so possibly BREANNE component). Eventually started on furosemide 20 mg daily-> increased to 40 mg BID by PCP as outpatient 10/22.   *Most recent EF 53% on echocardiogram 10/11/21  *Troponin 0.228, similar elevation seen during recent admission, though with report of chest pressure and ventricular arrhythmias new episode of ACS possible   - Continue furosemide IV 80 mg TID based on home diuretic regimen (>60 mg furosemide daily)  - Cardiology consulted  - Trend troponins  - Heparin drip for now pending troponin trend, cardiology recommendations   - Daily BMP while diuresing  - Repeat  echocardiogram   - Telemetry  - Continue amiodarone drip   - Continue prior to admission aspirin, clopidogrel, rosuvastatin, metoprolol XL     Hyponatremia  Sodium 132. 133 at time of discharge 10/18. Note made of SIADH during last admission. Clinically hypervolemic.  - Daily BMP as above    Hypokalemia  Recent diuretics likely contributing   - Monitor and replace per protocol     AST and ALT elevation  AST 57, ALT 83.  Total bilirubin normal, INR mildly elevated at 1.32.  Does not appear LFTs were checked during recent admission, historically normal 3/2021.  Concern for congestive hepatopathy in the setting of CHF.  Also was recently started on rosuvastatin, possibly contributing if not resolving with diuresis.   - Monitor LFTs, INR with diuresis. Recheck in AM.    Acute kidney injury on chronic kidney disease, stage 3  Baseline creatinine 1.4-1.6 with GFR in 40s. Creatinine 2.04 on admission. Mild SHAYE with peak of 1.72 10/13/21 during recent admission, possibly due to diuresis though timing consistent with BREANNE from angiogram as well.  Clinically hypervolemic, thus concerning for cardiorenal physiology.  - Avoid nephrotoxins  - Daily BMP with diuresis as above    Recent acute blood loss anemia  Hemoglobin 8.5 g/dl, stable from recent discharge of 8.7 g/dl.  Was seen by gastroenterology during recent admission however no EGD was recommended and cardiology had attributed his drop in hemoglobin to blood loss from access site following first angiogram.  - Monitor hemoglobin closely while on heparin drip  - CBC in the AM       Diet:   2 gram sodium restricted diet   DVT Prophylaxis: Heparin drip   Ignacio Catheter: Not present  Code Status:   DNR/DNI - Discussed with patient     Disposition Plan   Admit to inpatient. Anticipate greater than or equal to 2 midnights prior to discharge.      Entered: Sean yNe MD 10/24/2021, 2:29 PM     The patient's care was discussed with the Bedside Nurse and Patient.    Clinically  Significant Risk Factors Present on Admission         # Hyponatremia: Na = 132 mmol/L (Ref range: 133 - 144 mmol/L) on admission, will monitor as appropriate     # Coagulation Defect: INR = 1.32 (Ref range: 0.85 - 1.15) and/or PTT = N/A on admission, will monitor for bleeding  # Platelet Defect: home medication list includes an antiplatelet medication           Sean Nye MD  [facility]    ______________________________________________________________________    Chief Complaint   Lower extremity edema, shortness of breath for 1 week    History of Present Illness   Xavier Giordano is a 76 year old male who presents with the above chief complaint.    History is obtained from the patient. Recently admitted 10/11-10/16/21 at [facility] for NSTEMI, during which time he underwent angiogram x2, the first with angioplasty and thrombectomy followed by staged PCI several days later.  His course was complicated by acute kidney injury, attributed to diuresis though occurred following first angiogram.  He also had bleeding from his access site resulting in anemia, though also had reported melena and was seen by GI with no endoscopy recommended, and to monitor for clinical signs of bleeding. He reports he initially felt well for 2 days following discharge, however subsequently developed increasing bilateral lower extremity edema, progressive shortness of breath when lying flat and eventually with activity.  His primary care provider had prescribed furosemide 20 mg daily, however he had progressive symptoms and was again seen on 10/22 at which point his furosemide was increased to 40 mg twice daily.  Despite this he had progressive symptoms and thus presented to the [facility] emergency department for further evaluation.  In addition to this he reports he has had some intermittent chest pressure as well as sensation of palpitations.  He denies any cough, fevers, chills. He reports he has been  compliant with all of his medications.  He denies any melena or hematochezia.    In the Emergency Department, the patient was seen by Dr. Cortes, with whom I discussed the patient's presenting symptoms and emergency department course.  Initial vital signs were a temperature of 98.7F, , /80, RR 24, SpO2 96% on room air. Work-up included EKG with runs of wide complex tachycardia, CXR with mild interstitial prominence, potassium 3.3, troponin 0.228 . Hospitalists were contacted for admission to the hospital.     Review of Systems    Complete 10 point review of systems assessed and negative except as noted in HPI.    Past Medical History    I have reviewed this patient's medical history and updated it with pertinent information if needed.   Past Medical History:   Diagnosis Date     Abdominal aortic aneurysm (AAA) (H)      Acute diastolic congestive heart failure (H)      Anemia due to blood loss, acute      Coronary artery disease     Status post CABG 2005, LIMA to LAD, SVG to D1, OM1, Cx, distal RCA; ART to SVG OM1     HTN (hypertension)      Mixed hyperlipidemia      NSTEMI (non-ST elevated myocardial infarction) (H)      Peripheral arterial disease (H)      SIADH (syndrome of inappropriate ADH production) (H)        Past Surgical History   I have reviewed this patient's surgical history and updated it with pertinent information if needed.  Past Surgical History:   Procedure Laterality Date     AMPUTATION      Toe     BYPASS GRAFT ARTERY CORONARY       HERNIA REPAIR           Social History   I have reviewed this patient's social history and updated it with pertinent information if needed.  Social History     Tobacco Use     Smoking status: Former Smoker     Types: Cigarettes   Substance Use Topics     Alcohol use: Not Currently     Drug use: Never     Lives with his dog. His wife has dementia and is currently awaiting placement in a facility.      Family History   I have reviewed this patient's family  history and updated it with pertinent information if needed.   Family History   Problem Relation Age of Onset     Heart Disease Mother          90s of heart condition     Cancer Father          87, cancer     No Known Problems Sister      No Known Problems Brother        Prior to Admission Medications   Prior to Admission Medications   Prescriptions Last Dose Informant Patient Reported? Taking?   Vitamin D, Cholecalciferol, 25 MCG (1000 UT) CAPS 10/23/2021 at am Self Yes Yes   Sig: Take 2,000 Units by mouth daily   acetaminophen (TYLENOL) 325 MG tablet  at prn Self Yes Yes   Sig: Take 650 mg by mouth every 4 hours as needed for mild pain   aspirin 81 MG EC tablet 10/24/2021 at am Self Yes Yes   Sig: Take 81 mg by mouth daily   clopidogrel (PLAVIX) 75 MG tablet 10/24/2021 at am Self Yes Yes   Sig: Take 75 mg by mouth daily   furosemide (LASIX) 40 MG tablet 10/24/2021 at 0600 Self Yes Yes   Sig: Take 40 mg by mouth 2 times daily Take at 6 am and noon   isosorbide mononitrate (IMDUR) 60 MG 24 hr tablet 10/23/2021 at hs Self Yes Yes   Sig: Take 30 mg by mouth At Bedtime   metoprolol succinate ER (TOPROL-XL) 50 MG 24 hr tablet 10/24/2021 at am Self Yes Yes   Sig: Take 50 mg by mouth 2 times daily   multivitamin w/minerals (THERA-VIT-M) tablet 10/23/2021 at am Self Yes Yes   Sig: Take 1 tablet by mouth daily   nitroGLYcerin (NITROSTAT) 0.4 MG sublingual tablet  at prn Self Yes Yes   Sig: Place 0.4 mg under the tongue every 5 minutes as needed for chest pain For chest pain place 1 tablet under the tongue every 5 minutes for 3 doses. If symptoms persist 5 minutes after 1st dose call 911.   pantoprazole (PROTONIX) 40 MG EC tablet 10/24/2021 at am Self Yes Yes   Sig: Take 40 mg by mouth 2 times daily   rosuvastatin (CRESTOR) 40 MG tablet 10/23/2021 at hs Self Yes Yes   Sig: Take 40 mg by mouth At Bedtime   vitamin C (ASCORBIC ACID) 1000 MG TABS 10/23/2021 at am Self Yes Yes   Sig: Take 1,000 mg by mouth daily       Facility-Administered Medications: None     Allergies   Allergies   Allergen Reactions     Amlodipine Other (See Comments)     Worse leg swelling than when on coreg or metoprolol     Atenolol Other (See Comments)     Carvedilol Other (See Comments)     Sharp calf pain     Ciprofloxacin Other (See Comments)     Patient on metronidazole and ciprofloxacin at same time for diverticulitis.  Developed nausea, headache, loose stool.     Metoprolol Other (See Comments)     Leg pain, darkening toes (?atheroembolic)     Metronidazole Other (See Comments)     Patient on metronidazole and ciprofloxacin at same time for diverticulitis.  Developed nausea, headache, loose stool.     Morphine Other (See Comments)     Chills     Simvastatin Rash     Skin pealing       Physical Exam   Vital Signs: Temp: 98.7  F (37.1  C) Temp src: Temporal BP: 107/69 Pulse: (!) 126   Resp: 21 SpO2: 96 %      Weight: 0 lbs 0 oz    Constitutional: NAD  Eyes: PERRL, EOMI  HENT: Oropharynx clear, MMM  Respiratory: Clear to auscultation bilaterally, no wheezes, normal effort at rest  Cardiovascular: Irregular rhythm with tachycardic rate, 2+ bilateral lower extremity edema to above the knee   GI: Soft, non-tender, non-distended. No rebound tenderness or guarding.    Skin: Warm, dry   Neurologic: Alert. Responding to questions appropriately. Following commands.    Psychiatric: Normal affect, appropriate      Data   Data reviewed today: I reviewed all medications, new labs and imaging results over the last 24 hours. I personally reviewed the EKG tracing showing wide QRS regular tachycardia with .    Recent Labs   Lab 10/24/21  1038   WBC 9.5   HGB 8.5*   MCV 94      INR 1.32*   *   POTASSIUM 3.3*   CHLORIDE 101   CO2 21   BUN 43*   CR 2.04*   ANIONGAP 10   MATTHEW 8.4*   *   ALBUMIN 3.2*   PROTTOTAL 7.3   BILITOTAL 0.9   ALKPHOS 135   ALT 83*   AST 57*   TROPONIN 0.228*       Recent Results (from the past 24 hour(s))   XR Chest Port  1 View    Narrative    EXAM: XR CHEST PORT 1 VIEW  LOCATION: [facility]  DATE/TIME: 10/24/2021 10:40 AM    INDICATION: Chest pain. Evaluate CHF.  COMPARISON: None.      Impression    IMPRESSION: Artifact and overlying material mildly compromise exam.    Mild interstitial prominence could be from vascular congestion, though ultimately nonspecific. Mild airway thickening. No focal consolidation or pneumonia.    Upper normal heart size, exaggerated by technique. Sternotomy. CABG. Aortic atherosclerosis.    No pleural effusion or pneumothorax.

## 2021-10-24 NOTE — PLAN OF CARE
Shift Summary    Neuro: A&O  Cardiac: Episodes of VT/SVT rhythm. Otherwise SR. 2nd bolus of amio given. On Amio gtt and Heparin gtt. Electrolytes replaced.   Pulmonary: SOB. LS course. 2L NC  GI/: Voiding  Skin: Scattered bruising  Activity: A1  Pain: Generalized pains. Denies CP.

## 2021-10-24 NOTE — PHARMACY-ADMISSION MEDICATION HISTORY
Pharmacy Medication History  Admission medication history interview status for the 10/24/2021  admission is complete. See EPIC admission navigator for prior to admission medications     Location of Interview: Patient room  Medication history sources: Patient, Surescripts and Care Everywhere    Significant changes made to the medication list:  All meds added.    In the past week, patient estimated taking medication this percent of the time: greater than 90%    Additional medication history information:   Patient was recently discharged from [facility] and was very informed on his recent medication changes.     Medication reconciliation completed by provider prior to medication history? No    Time spent in this activity: 15 minutes    Prior to Admission medications    Medication Sig Last Dose Taking? Auth Provider   acetaminophen (TYLENOL) 325 MG tablet Take 650 mg by mouth every 4 hours as needed for mild pain  at prn Yes Unknown, Entered By History   aspirin 81 MG EC tablet Take 81 mg by mouth daily 10/24/2021 at am Yes Unknown, Entered By History   clopidogrel (PLAVIX) 75 MG tablet Take 75 mg by mouth daily 10/24/2021 at am Yes Unknown, Entered By History   furosemide (LASIX) 40 MG tablet Take 40 mg by mouth 2 times daily Take at 6 am and noon 10/24/2021 at 0600 Yes Unknown, Entered By History   isosorbide mononitrate (IMDUR) 60 MG 24 hr tablet Take 30 mg by mouth At Bedtime 10/23/2021 at hs Yes Unknown, Entered By History   metoprolol succinate ER (TOPROL-XL) 50 MG 24 hr tablet Take 50 mg by mouth 2 times daily 10/24/2021 at am Yes Unknown, Entered By History   multivitamin w/minerals (THERA-VIT-M) tablet Take 1 tablet by mouth daily 10/23/2021 at am Yes Unknown, Entered By History   nitroGLYcerin (NITROSTAT) 0.4 MG sublingual tablet Place 0.4 mg under the tongue every 5 minutes as needed for chest pain For chest pain place 1 tablet under the tongue every 5 minutes for 3 doses. If symptoms persist 5 minutes after 1st  dose call 911.  at prn Yes Unknown, Entered By History   pantoprazole (PROTONIX) 40 MG EC tablet Take 40 mg by mouth 2 times daily 10/24/2021 at am Yes Unknown, Entered By History   rosuvastatin (CRESTOR) 40 MG tablet Take 40 mg by mouth At Bedtime 10/23/2021 at hs Yes Unknown, Entered By History   vitamin C (ASCORBIC ACID) 1000 MG TABS Take 1,000 mg by mouth daily 10/23/2021 at am Yes Unknown, Entered By History   Vitamin D, Cholecalciferol, 25 MCG (1000 UT) CAPS Take 2,000 Units by mouth daily 10/23/2021 at am Yes Unknown, Entered By History       The information provided in this note is only as accurate as the sources available at the time of update(s)

## 2021-10-24 NOTE — CONSULTS
[facility]    Cardiology Consultation     Date of Admission:  10/24/2021    Assessment & Plan   Xavier Giordano is a 76 year old male who was admitted on 10/24/2021. I was asked to see the patient for ventricular tachycardia.    1. Ventricular tachycardia   2. NSTEMI, chest pain   3. Paroxysmal atrial fibrillation with aberrant conduction  4. Coronary artery disease with history of CABG (LIMA to LAD, SVG to first diagonal, SVG to OM1).  Native coronaries are occluded and he has significant disease in diagonal graft (outside institution was planning a staged intervention)  5. Recent PCI to OM1 vein graft on October 11, 2021 at an outside institution  6. Hypertension  7. Hyperlipidemia  8. Acute on chronic heart failure with preserved ejection fraction (repeat transthoracic echocardiogram is pending).   9. CKD stage III - acute on chronic kidney disease    The patient presented with a wide-complex tachycardia into the 150s to 160s.  This was consistent with ventricular tachycardia especially given his history.  He was given IV bolus dose of amiodarone and started on the drip.  He has since had paroxysmal episodes of ventricular tachycardia versus atrial fibrillation with aberrancy.      His baseline ECG is recorded in the system which reveals sinus rhythm and a narrow complex without any acute ischemic changes.  He currently is chest pain-free and reports that his shortness of breath is somewhat improved following IV diuresis.      On review of his ECGs he has 2 separate rhythms of wide-complex tachycardia including a normal QRS duration when in sinus and when he speeds up into the 120s with atrial fibrillation has a similar access to his sinus rhythm but has a broad QRS consistent with aberrant conduction.  The initial ECG on presentation however had a different axis is more suggestive of ventricular tachycardia.    Recommendations:      Trend troponin, continue heparin drip    Can diurese  further overnight once electrolytes have been replaced.    Continue IV amiodarone, if he has any worsening nonsustained episodes of ventricular tachycardia rebolus with 150 mg of amiodarone    Electrophysiology consultation in the a.m. can discuss with them the 2 separate rhythms including likely A. fib with aberrancy and also ventricular tachycardia (twelve-lead ECG documenting conversion is placed in the chart).    N.p.o. at midnight for coronary angiography.  We will need to transition his CODE STATUS to full code for the procedure    Aggressive potassium and magnesium replacement to keep potassium above 4, magnesium above 2    Cardiology will continue to follow and provide recommendations as necessary.    Addendum:   Coronary angiogram ordered.  I briefly discussed the procedure with the patient, but informed consent is not signed just yet.  I would like the day team to evaluate his renal function in the morning prior to proceeding with the coronary angiogram.  He does have baseline CKD.     Alfredo Agudelo MD    Code Status    No CPR- Do NOT Intubate    Reason for Consult   Reason for consult: Elevated troponin, ventricular tachycardia    Primary Care Physician   Natty Crook    Chief Complaint   Chest pain, shortness of breath    History is obtained from the patient    History of Present Illness   Xavier Giordano is a 76 year old male who presents with history obtained from the electronic medical records and from the patient.  In brief he had a recent hospital admission on October 11, 2021.  He has known history of coronary artery disease with previous CABG in 2005.  He has had several drug-eluting stents to the saphenous vein graft to the first obtuse marginal including most recently during the last hospitalization earlier this month.  She presented with chest pain and a positive troponin along with several runs of nonsustained ventricular tachycardia.  The patient also endorsed shortness of breath and  progressively worsening lower extremity swelling.  He underwent PCI on the same day of admission and had aspiration thrombectomy and angioplasty of the saphenous vein graft to the first obtuse marginal.  He subsequently had a staged PCI to the saphenous vein graft with a drug-eluting stent.    He now presents with worsening chest pain since yesterday evening along with shortness of breath.  He attempted to go to sleep but was unable to fall asleep due to his symptoms.  The remainder of his recent history as outlined above from the hospitalization.  He currently feels somewhat improved with his shortness of breath and chest discomfort.    Transthoracic echocardiogram during that hospitalization as listed below    Imaging  Echocardiogram 10/11/21  Final Impressions:  1. Normal left ventricular size, mildly increased wall thickness, low normal global systolic function, calculated EF of 53 %.  2. Inferior wall and posterior wall are abnormal.  3. Mildly enlarged left atrium.  4. The aortic valve is sclerotic, no stenosis and mild to moderate regurgitation.  5. The mitral valve is sclerotic, moderate mitral regurgitation, eccentric jet directed anteromedially suggests posterior mitral valve leaflet dysfunction.  6. Moderate tricuspid regurgitation.  7. The inferior vena cava is normal sized, respiratory size variation less than 50%.      Percutaneous coronary intervention 10/11/21  DIAGNOSTIC - CORONARY  * Multivessel coronary disease (100% proximal RCA, 100% proximal LAD, 100% mid LAD, 90% proximal circumflex, 100% OM1)  DIAGNOSTIC - GRAFTS  * The LIMA to the LAD is patent  * 90% stenosis in the mid body of the SVG graft to the 1st diagonal  * Acute total occlusion in the proximal anastomosis of the SVG graft to the 1st obtuse marginal  INTERVENTION  * Successful aspiration thrombectomy and angioplasty of the saphenous vein graft to the 1st obtuse marginal       Percutaneous coronary intervention 10/14/21  Successful  angioplasty and stenting (drug eluting) of the saphenous vein graft to the 1st obtuse marginal       Past Medical History   I have reviewed this patient's medical history and updated it with pertinent information if needed.   Past Medical History:   Diagnosis Date     Abdominal aortic aneurysm (AAA) (H)      Acute diastolic congestive heart failure (H)      Anemia due to blood loss, acute      Coronary artery disease     Status post CABG 2005, LIMA to LAD, SVG to D1, OM1, Cx, distal RCA; ART to SVG OM1     HTN (hypertension)      Mixed hyperlipidemia      NSTEMI (non-ST elevated myocardial infarction) (H)      Peripheral arterial disease (H)      SIADH (syndrome of inappropriate ADH production) (H)        Past Surgical History   I have reviewed this patient's surgical history and updated it with pertinent information if needed.  Past Surgical History:   Procedure Laterality Date     AMPUTATION      Toe     BYPASS GRAFT ARTERY CORONARY       HERNIA REPAIR         Prior to Admission Medications   Prior to Admission Medications   Prescriptions Last Dose Informant Patient Reported? Taking?   Vitamin D, Cholecalciferol, 25 MCG (1000 UT) CAPS 10/23/2021 at am Self Yes Yes   Sig: Take 2,000 Units by mouth daily   acetaminophen (TYLENOL) 325 MG tablet  at prn Self Yes Yes   Sig: Take 650 mg by mouth every 4 hours as needed for mild pain   aspirin 81 MG EC tablet 10/24/2021 at am Self Yes Yes   Sig: Take 81 mg by mouth daily   clopidogrel (PLAVIX) 75 MG tablet 10/24/2021 at am Self Yes Yes   Sig: Take 75 mg by mouth daily   furosemide (LASIX) 40 MG tablet 10/24/2021 at 0600 Self Yes Yes   Sig: Take 40 mg by mouth 2 times daily Take at 6 am and noon   isosorbide mononitrate (IMDUR) 60 MG 24 hr tablet 10/23/2021 at hs Self Yes Yes   Sig: Take 30 mg by mouth At Bedtime   metoprolol succinate ER (TOPROL-XL) 50 MG 24 hr tablet 10/24/2021 at am Self Yes Yes   Sig: Take 50 mg by mouth 2 times daily   multivitamin w/minerals  (THERA-VIT-M) tablet 10/23/2021 at am Self Yes Yes   Sig: Take 1 tablet by mouth daily   nitroGLYcerin (NITROSTAT) 0.4 MG sublingual tablet  at prn Self Yes Yes   Sig: Place 0.4 mg under the tongue every 5 minutes as needed for chest pain For chest pain place 1 tablet under the tongue every 5 minutes for 3 doses. If symptoms persist 5 minutes after 1st dose call 911.   pantoprazole (PROTONIX) 40 MG EC tablet 10/24/2021 at am Self Yes Yes   Sig: Take 40 mg by mouth 2 times daily   rosuvastatin (CRESTOR) 40 MG tablet 10/23/2021 at hs Self Yes Yes   Sig: Take 40 mg by mouth At Bedtime   vitamin C (ASCORBIC ACID) 1000 MG TABS 10/23/2021 at am Self Yes Yes   Sig: Take 1,000 mg by mouth daily      Facility-Administered Medications: None     Allergies   Allergies   Allergen Reactions     Amlodipine Other (See Comments)     Worse leg swelling than when on coreg or metoprolol     Atenolol Other (See Comments)     Carvedilol Other (See Comments)     Sharp calf pain     Ciprofloxacin Other (See Comments)     Patient on metronidazole and ciprofloxacin at same time for diverticulitis.  Developed nausea, headache, loose stool.     Metoprolol Other (See Comments)     Leg pain, darkening toes (?atheroembolic)     Metronidazole Other (See Comments)     Patient on metronidazole and ciprofloxacin at same time for diverticulitis.  Developed nausea, headache, loose stool.     Morphine Other (See Comments)     Chills     Simvastatin Rash     Skin pealing       Social History   I have reviewed this patient's social history and updated it with pertinent information if needed. Xavier Giordano  reports that he has quit smoking. His smoking use included cigarettes. He does not have any smokeless tobacco history on file. He reports previous alcohol use. He reports that he does not use drugs.    Family History   I have reviewed this patient's family history and updated it with pertinent information if needed.   Family History   Problem Relation  Age of Onset     Heart Disease Mother          90s of heart condition     Cancer Father          87, cancer     No Known Problems Sister      No Known Problems Brother        Review of Systems   The 10 point Review of Systems is negative other than noted in the HPI or here.     Physical Exam   Temp: 98.7  F (37.1  C) Temp src: Temporal BP: 99/62 Pulse: 70   Resp: 24 SpO2: 90 %      Vital Signs with Ranges  Temp:  [98.7  F (37.1  C)] 98.7  F (37.1  C)  Pulse:  [] 70  Resp:  [12-30] 24  BP: ()/(57-83) 99/62  SpO2:  [90 %-97 %] 90 %  0 lbs 0 oz    Alert awake and oriented x3  Regular rate and rhythm, soft systolic murmur over the left lower sternal border  Faint bibasilar crackles  Abdomen is soft and nontender  Right lower extremity/inguinal region with ecchymosis, mild ecchymosis on the left catheterization site.  Lower extremities are warm well perfused without edema    Data      Repeat ECG wide-complex tachycardia, with no clear P waves delineated  ECG on presentation looks like ventricular tachycardia with a positive aVR and axis change that is different from his previously noted ECG.    Most Recent 3 CBC's:Recent Labs   Lab Test 10/24/21  1038   WBC 9.5   HGB 8.5*   MCV 94        Most Recent 3 BMP's:Recent Labs   Lab Test 10/24/21  1038   *   POTASSIUM 3.3*   CHLORIDE 101   CO2 21   BUN 43*   CR 2.04*   ANIONGAP 10   MATTHEW 8.4*   *     Most Recent 2 LFT's:Recent Labs   Lab Test 10/24/21  1038   AST 57*   ALT 83*   ALKPHOS 135   BILITOTAL 0.9     Most Recent 3 INR's:Recent Labs   Lab Test 10/24/21  1038   INR 1.32*     Most Recent 3 Hemoglobins:Recent Labs   Lab Test 10/24/21  1038   HGB 8.5*     Most Recent 3 Troponin's:Recent Labs   Lab Test 10/24/21  1038   TROPONIN 0.228*     Most Recent 3 BNP's:Recent Labs   Lab Test 10/24/21  1038   NTBNPI 19,795*     Most Recent TSH and T4:Recent Labs   Lab Test 10/24/21  1038   TSH 1.66       EXAM: XR CHEST PORT 1 VIEW  LOCATION: [facility]  DATE/TIME: 10/24/2021 10:40 AM     INDICATION: Chest pain. Evaluate CHF.  COMPARISON: None.                                                                      IMPRESSION: Artifact and overlying material mildly compromise exam.     Mild interstitial prominence could be from vascular congestion, though ultimately nonspecific. Mild airway thickening. No focal consolidation or pneumonia.     Upper normal heart size, exaggerated by technique. Sternotomy. CABG. Aortic atherosclerosis.     No pleural effusion or pneumothorax.

## 2021-10-25 ENCOUNTER — APPOINTMENT (OUTPATIENT)
Dept: ULTRASOUND IMAGING | Facility: CLINIC | Age: 76
DRG: 280 | End: 2021-10-25
Attending: INTERNAL MEDICINE
Payer: MEDICARE

## 2021-10-25 ENCOUNTER — APPOINTMENT (OUTPATIENT)
Dept: CARDIOLOGY | Facility: CLINIC | Age: 76
DRG: 280 | End: 2021-10-25
Attending: INTERNAL MEDICINE
Payer: MEDICARE

## 2021-10-25 LAB
ALBUMIN SERPL-MCNC: 2.9 G/DL (ref 3.4–5)
ALP SERPL-CCNC: 149 U/L (ref 40–150)
ALT SERPL W P-5'-P-CCNC: 606 U/L (ref 0–70)
ANION GAP SERPL CALCULATED.3IONS-SCNC: 11 MMOL/L (ref 3–14)
AST SERPL W P-5'-P-CCNC: 813 U/L (ref 0–45)
BILIRUB DIRECT SERPL-MCNC: 0.5 MG/DL (ref 0–0.2)
BILIRUB SERPL-MCNC: 1 MG/DL (ref 0.2–1.3)
BUN SERPL-MCNC: 49 MG/DL (ref 7–30)
CALCIUM SERPL-MCNC: 8.1 MG/DL (ref 8.5–10.1)
CHLORIDE BLD-SCNC: 102 MMOL/L (ref 94–109)
CO2 SERPL-SCNC: 19 MMOL/L (ref 20–32)
CREAT SERPL-MCNC: 2.38 MG/DL (ref 0.66–1.25)
FERRITIN SERPL-MCNC: 1561 NG/ML (ref 26–388)
GFR SERPL CREATININE-BSD FRML MDRD: 26 ML/MIN/1.73M2
GLUCOSE BLD-MCNC: 105 MG/DL (ref 70–99)
INR PPP: 1.56 (ref 0.85–1.15)
LVEF ECHO: NORMAL
MAGNESIUM SERPL-MCNC: 2.8 MG/DL (ref 1.6–2.3)
MAGNESIUM SERPL-MCNC: 2.9 MG/DL (ref 1.6–2.3)
POTASSIUM BLD-SCNC: 3.9 MMOL/L (ref 3.4–5.3)
PROT SERPL-MCNC: 6.5 G/DL (ref 6.8–8.8)
SODIUM SERPL-SCNC: 132 MMOL/L (ref 133–144)
TROPONIN I SERPL-MCNC: 2.98 UG/L (ref 0–0.04)
UFH PPP CHRO-ACNC: 0.25 IU/ML

## 2021-10-25 PROCEDURE — 255N000002 HC RX 255 OP 636: Performed by: INTERNAL MEDICINE

## 2021-10-25 PROCEDURE — 93308 TTE F-UP OR LMTD: CPT | Mod: 26 | Performed by: INTERNAL MEDICINE

## 2021-10-25 PROCEDURE — 250N000013 HC RX MED GY IP 250 OP 250 PS 637: Performed by: INTERNAL MEDICINE

## 2021-10-25 PROCEDURE — 250N000013 HC RX MED GY IP 250 OP 250 PS 637: Performed by: NURSE PRACTITIONER

## 2021-10-25 PROCEDURE — 99223 1ST HOSP IP/OBS HIGH 75: CPT | Mod: 25 | Performed by: INTERNAL MEDICINE

## 2021-10-25 PROCEDURE — 86803 HEPATITIS C AB TEST: CPT | Performed by: INTERNAL MEDICINE

## 2021-10-25 PROCEDURE — 86706 HEP B SURFACE ANTIBODY: CPT | Performed by: INTERNAL MEDICINE

## 2021-10-25 PROCEDURE — 210N000002 HC R&B HEART CARE

## 2021-10-25 PROCEDURE — 86704 HEP B CORE ANTIBODY TOTAL: CPT | Performed by: INTERNAL MEDICINE

## 2021-10-25 PROCEDURE — 87340 HEPATITIS B SURFACE AG IA: CPT | Performed by: INTERNAL MEDICINE

## 2021-10-25 PROCEDURE — 999N000208 ECHOCARDIOGRAM COMPLETE

## 2021-10-25 PROCEDURE — 82390 ASSAY OF CERULOPLASMIN: CPT | Performed by: INTERNAL MEDICINE

## 2021-10-25 PROCEDURE — 85610 PROTHROMBIN TIME: CPT | Performed by: INTERNAL MEDICINE

## 2021-10-25 PROCEDURE — 93325 DOPPLER ECHO COLOR FLOW MAPG: CPT

## 2021-10-25 PROCEDURE — 82728 ASSAY OF FERRITIN: CPT | Performed by: INTERNAL MEDICINE

## 2021-10-25 PROCEDURE — 250N000011 HC RX IP 250 OP 636: Performed by: INTERNAL MEDICINE

## 2021-10-25 PROCEDURE — 99223 1ST HOSP IP/OBS HIGH 75: CPT | Performed by: INTERNAL MEDICINE

## 2021-10-25 PROCEDURE — 99233 SBSQ HOSP IP/OBS HIGH 50: CPT | Performed by: INTERNAL MEDICINE

## 2021-10-25 PROCEDURE — 93321 DOPPLER ECHO F-UP/LMTD STD: CPT | Mod: 26 | Performed by: INTERNAL MEDICINE

## 2021-10-25 PROCEDURE — 250N000009 HC RX 250: Performed by: NURSE PRACTITIONER

## 2021-10-25 PROCEDURE — 83516 IMMUNOASSAY NONANTIBODY: CPT | Performed by: INTERNAL MEDICINE

## 2021-10-25 PROCEDURE — 80048 BASIC METABOLIC PNL TOTAL CA: CPT | Performed by: INTERNAL MEDICINE

## 2021-10-25 PROCEDURE — 82248 BILIRUBIN DIRECT: CPT | Performed by: INTERNAL MEDICINE

## 2021-10-25 PROCEDURE — 84484 ASSAY OF TROPONIN QUANT: CPT | Performed by: NURSE PRACTITIONER

## 2021-10-25 PROCEDURE — 76705 ECHO EXAM OF ABDOMEN: CPT

## 2021-10-25 PROCEDURE — 83735 ASSAY OF MAGNESIUM: CPT | Performed by: INTERNAL MEDICINE

## 2021-10-25 PROCEDURE — 93325 DOPPLER ECHO COLOR FLOW MAPG: CPT | Mod: 26 | Performed by: INTERNAL MEDICINE

## 2021-10-25 PROCEDURE — 85520 HEPARIN ASSAY: CPT | Performed by: INTERNAL MEDICINE

## 2021-10-25 PROCEDURE — 36415 COLL VENOUS BLD VENIPUNCTURE: CPT | Performed by: INTERNAL MEDICINE

## 2021-10-25 RX ORDER — LIDOCAINE 40 MG/G
CREAM TOPICAL
Status: DISCONTINUED | OUTPATIENT
Start: 2021-10-25 | End: 2021-10-25

## 2021-10-25 RX ORDER — LORAZEPAM 2 MG/ML
0.5 INJECTION INTRAMUSCULAR
Status: DISCONTINUED | OUTPATIENT
Start: 2021-10-25 | End: 2021-10-28 | Stop reason: HOSPADM

## 2021-10-25 RX ORDER — ASPIRIN 81 MG/1
243 TABLET, CHEWABLE ORAL ONCE
Status: DISCONTINUED | OUTPATIENT
Start: 2021-10-25 | End: 2021-10-28 | Stop reason: HOSPADM

## 2021-10-25 RX ORDER — AMIODARONE HYDROCHLORIDE 200 MG/1
400 TABLET ORAL DAILY
Status: DISCONTINUED | OUTPATIENT
Start: 2021-10-25 | End: 2021-10-26

## 2021-10-25 RX ORDER — SODIUM CHLORIDE 9 MG/ML
INJECTION, SOLUTION INTRAVENOUS CONTINUOUS
Status: DISCONTINUED | OUTPATIENT
Start: 2021-10-25 | End: 2021-10-28 | Stop reason: HOSPADM

## 2021-10-25 RX ORDER — CHLOROTHIAZIDE SODIUM 500 MG/1
500 INJECTION INTRAVENOUS ONCE
Status: COMPLETED | OUTPATIENT
Start: 2021-10-25 | End: 2021-10-25

## 2021-10-25 RX ORDER — LORAZEPAM 0.5 MG/1
0.5 TABLET ORAL
Status: DISCONTINUED | OUTPATIENT
Start: 2021-10-25 | End: 2021-10-28 | Stop reason: HOSPADM

## 2021-10-25 RX ORDER — ASPIRIN 325 MG
325 TABLET ORAL ONCE
Status: DISCONTINUED | OUTPATIENT
Start: 2021-10-25 | End: 2021-10-28 | Stop reason: HOSPADM

## 2021-10-25 RX ORDER — POTASSIUM CHLORIDE 1500 MG/1
20 TABLET, EXTENDED RELEASE ORAL
Status: DISCONTINUED | OUTPATIENT
Start: 2021-10-25 | End: 2021-10-28 | Stop reason: HOSPADM

## 2021-10-25 RX ORDER — AMIODARONE HYDROCHLORIDE 200 MG/1
200 TABLET ORAL DAILY
Status: DISCONTINUED | OUTPATIENT
Start: 2021-11-01 | End: 2021-10-26

## 2021-10-25 RX ADMIN — CLOPIDOGREL BISULFATE 75 MG: 75 TABLET ORAL at 09:33

## 2021-10-25 RX ADMIN — PANTOPRAZOLE SODIUM 40 MG: 40 TABLET, DELAYED RELEASE ORAL at 20:45

## 2021-10-25 RX ADMIN — ISOSORBIDE MONONITRATE 30 MG: 30 TABLET, EXTENDED RELEASE ORAL at 20:45

## 2021-10-25 RX ADMIN — ZOLPIDEM TARTRATE 2.5 MG: 5 TABLET, FILM COATED ORAL at 20:45

## 2021-10-25 RX ADMIN — ASPIRIN 81 MG: 81 TABLET, COATED ORAL at 09:33

## 2021-10-25 RX ADMIN — HUMAN ALBUMIN MICROSPHERES AND PERFLUTREN 9 ML: 10; .22 INJECTION, SOLUTION INTRAVENOUS at 10:27

## 2021-10-25 RX ADMIN — AMIODARONE HYDROCHLORIDE 400 MG: 200 TABLET ORAL at 11:13

## 2021-10-25 RX ADMIN — PANTOPRAZOLE SODIUM 40 MG: 40 TABLET, DELAYED RELEASE ORAL at 09:33

## 2021-10-25 RX ADMIN — METOPROLOL SUCCINATE 50 MG: 50 TABLET, EXTENDED RELEASE ORAL at 20:45

## 2021-10-25 RX ADMIN — FUROSEMIDE 80 MG: 10 INJECTION, SOLUTION INTRAVENOUS at 01:51

## 2021-10-25 RX ADMIN — CHLOROTHIAZIDE SODIUM 500 MG: 500 INJECTION, POWDER, LYOPHILIZED, FOR SOLUTION INTRAVENOUS at 17:20

## 2021-10-25 RX ADMIN — METOPROLOL SUCCINATE 50 MG: 50 TABLET, EXTENDED RELEASE ORAL at 09:33

## 2021-10-25 RX ADMIN — BUMETANIDE 0.5 MG/HR: 0.25 INJECTION INTRAMUSCULAR; INTRAVENOUS at 12:01

## 2021-10-25 ASSESSMENT — ACTIVITIES OF DAILY LIVING (ADL)
ADLS_ACUITY_SCORE: 10
ADLS_ACUITY_SCORE: 10
ADLS_ACUITY_SCORE: 8
ADLS_ACUITY_SCORE: 6
ADLS_ACUITY_SCORE: 6
ADLS_ACUITY_SCORE: 10
ADLS_ACUITY_SCORE: 6
ADLS_ACUITY_SCORE: 10
ADLS_ACUITY_SCORE: 8
ADLS_ACUITY_SCORE: 6
ADLS_ACUITY_SCORE: 10
ADLS_ACUITY_SCORE: 8
ADLS_ACUITY_SCORE: 10
ADLS_ACUITY_SCORE: 10
ADLS_ACUITY_SCORE: 6
ADLS_ACUITY_SCORE: 8
ADLS_ACUITY_SCORE: 8
ADLS_ACUITY_SCORE: 10
ADLS_ACUITY_SCORE: 6
ADLS_ACUITY_SCORE: 6
ADLS_ACUITY_SCORE: 8
ADLS_ACUITY_SCORE: 6

## 2021-10-25 ASSESSMENT — MIFFLIN-ST. JEOR: SCORE: 1406.69

## 2021-10-25 NOTE — PROGRESS NOTES
[facility]    Cardiology Progress Note    Date of Service: 10/25/2021     Assessment & Plan   Xavier Giordano is a 76 year old male with past medical history of CAD, CABG in 2005, CKD recent PCI at [facility], melena with hemoglobin drop recently at [facility], hyponatremia on that admission and Hydrochlorothiazide stopped, HTN, PAD (AAA), dyslipidemia, NSVT.  This patient was admitted on 10/24/2021 with symptoms of VINCENT, palpitations, and chest pressure.      1. Nonstemi  HX CABG 2005(LIMA to LAD, SVG to first diagonal, SVG to OM1    Admitted to ED here 10-11 for laceration and left AMA as needed to take dog to vet and find care for his wife, then went to [facility] where he underwent PCI  -angiography X2     First demonstrated known  of the LAD and the right coronary artery.    - new 90% lesion of the proximal circumflex compared compared with the angiogram done in 2017.    -saphenous vein graft to the obtuse marginal branch was occluded with intraluminal thrombus.    - saphenous vein graft to the diagonal branch had a 90% degenerated lesion in the middle segment, but had REINA 3 flow.    -The LIMA to LAD was patent, supplying the LAD that was also filling the PDA via mainly septal collaterals.   Had SVG OM-2 thrombectomey with 48 hours of Eptfibatide then returned for      S/p PCI to SVG of  with ART (Xience Isamar rx 3.5 x 33mm), resolute Otoniel 3.5mm x 38mm, Xience 3.25mm x 38mm Xience and Xience 3.25 X 38mm  -staged diagonal PCI planned at [facility] to [place]  He also suffered a right groin ooze; left groin is clean; no hum or bruit on either side    Trop 3.4 last pm; will check this am to trend     PLAN:  Stop Heparin  He states his chest pain on admission was more related to his tachycardia and not his previous chest pain PCI  HOLD ON ANGIOGRAM and reassess-cath labs notified  Continue DAPT nitrates and betablockers      2. CKD with  worsening creatinine   Creatinine at [facility] appeared to have baseline of 1.4-1.5  Admitted here at 2.04, now 2.38 post IV lasix  His PCP had increased his oral lasix from 20mg daily to 40mg BID at OP visit 10-22   Two dye loads and not a great urine OP from lasix IV every 8 hours  Uncertain what his dye load is  May be cardiorenal  PLAN reviewed with Dr. Dai    Nephrology consult  Bladder scan  Start bumex drip 0.5mg per hour  Bmp in am    3. HFpEF  BNP 19,795  Echo at [facility] on 10-11 showed EF 53% with WMA in inferior and posterior walls  Aortic sclerosis with mild to moderate AI, moderate MR and moderate TR  Echo here pending  CXR yesterday with mild interstitial prominence  Started on IV Lasix 80mg every 8 hours  Weight 159 (care everywhere weigh listed at 166)  His urine output is low in spite of lasix  Will scan bladder as above  Add bumex drip    4. Anemia with two days of Melena at [facility]  GI consult reportedly done at [facility] who did not recommend EGD  And thought hemoglobin drop was post instrumentation to right groin  mcv normal; platelets normal  Previous hemoglobins 11-12  Pre an post PCI now mid 8's on PPI    4. Wide complex tachycardia  Started on amiodarone drip-changing to oral  EP following  Sinus now with PVC's    5 acute elevation of Liver enzymes and elevated INR  ast 57, now 813  Alt 83, now 606  Hospitalist notified and ultrasound ordered an Crestor put on hold  May be some component of passive congestion but RV function read as normal on echo at [facility]      6. Hypertension  Hydrochlorothiazide stopped recently due to low sodium  Sodium 132, up from 126 at [facility]    7. Dyslipidemia  Crestor on hold currently due to liver enzyme elevation   in March 8. Social issues  He cares for his wife with dementia who is currently hospitalized and his sister is assiting    Amy Stubbs, MSN, APRN, CNP  [facility]      Interval History   No chest pain like he had prior to PCI  Sob and  orthopnea    Review of Systems:  The Review of Systems is negative other than noted in the HPI    Physical Exam   Temp: 97.8  F (36.6  C) Temp src: Oral BP: 127/62 Pulse: 65   Resp: 22 SpO2: 95 % O2 Device: None (Room air) Oxygen Delivery: 2 LPM  Vitals:    10/24/21 1600 10/25/21 0148   Weight: 72.5 kg (159 lb 12.8 oz) 72.4 kg (159 lb 9.6 oz)     Vital Signs with Ranges  Temp:  [97.2  F (36.2  C)-98.7  F (37.1  C)] 97.8  F (36.6  C)  Pulse:  [] 65  Resp:  [12-30] 22  BP: ()/(57-83) 127/62  SpO2:  [83 %-97 %] 95 %  I/O last 3 completed shifts:  In: 709.24 [P.O.:240; I.V.:469.24]  Out: 825 [Urine:825]    Constitutional     alert and oriented, in no acute distress.     Skin     warm and dry to touch    ENT     ++pallor or cyanosis    Neck    Supple, JVP elevated no carotid bruit    Chest     no tenderness to palpation     Lungs  Crackles 1/2 up    Cardiac  regular rhythm, S1 normal, S2 normal, No S3 or S4, 2 separate systolic murmurs RUSB and apex to axilla    Abdomen     abdomen soft, bowel sounds normoactive, no hepatosplenomegaly    Extremities and Back     no clubbing, cyanosis. 2+ pitting edema       Neurological     no gross motor deficits noted, affect appropriate, oriented to time, person and place.        Medications     amiodarone 0.5 mg/min (10/24/21 1852)     - MEDICATION INSTRUCTIONS -       heparin 850 Units/hr (10/24/21 1611)     - MEDICATION INSTRUCTIONS -       - MEDICATION INSTRUCTIONS -       ACE/ARB/ARNI NOT PRESCRIBED       sodium chloride         aspirin  325 mg Oral Once    Or     aspirin  243 mg Oral Once     aspirin  81 mg Oral Daily     clopidogrel  75 mg Oral Daily     furosemide  80 mg Intravenous Q8H     isosorbide mononitrate  30 mg Oral At Bedtime     metoprolol succinate ER  50 mg Oral BID     pantoprazole  40 mg Oral BID     [Held by provider] rosuvastatin  40 mg Oral At Bedtime     sodium chloride (PF)  3 mL Intracatheter Q8H          Data:     ROUTINE IP LABS (Last four  results)  BMP  Recent Labs   Lab 10/25/21  0244 10/24/21  1601 10/24/21  1038   *  --  132*   POTASSIUM 3.9 4.8 3.3*   CHLORIDE 102  --  101   MATTHEW 8.1*  --  8.4*   CO2 19*  --  21   BUN 49*  --  43*   CR 2.38*  --  2.04*   *  --  122*     CHOLESTEROL/HEPATIC  Recent Labs   Lab 10/25/21  0244 10/24/21  1038   * 83*   * 57*     CBC  Recent Labs   Lab 10/24/21  1038   WBC 9.5   RBC 2.78*   HGB 8.5*   HCT 26.0*   MCV 94   MCH 30.6   MCHC 32.7   RDW 15.3*        TROP: No lab results found in last 7 days.   BNP:    Recent Labs   Lab 10/24/21  1038   NTBNPI 19,795*     INR  Recent Labs   Lab 10/25/21  0244 10/24/21  1038   INR 1.56* 1.32*     TSH   Date Value Ref Range Status   10/24/2021 1.66 0.40 - 4.00 mU/L Final       EKG results:  Reviewed if available     Imaging:  Recent Results (from the past 24 hour(s))   XR Chest Port 1 View    Narrative    EXAM: XR CHEST PORT 1 VIEW  LOCATION: [facility]  DATE/TIME: 10/24/2021 10:40 AM    INDICATION: Chest pain. Evaluate CHF.  COMPARISON: None.      Impression    IMPRESSION: Artifact and overlying material mildly compromise exam.    Mild interstitial prominence could be from vascular congestion, though ultimately nonspecific. Mild airway thickening. No focal consolidation or pneumonia.    Upper normal heart size, exaggerated by technique. Sternotomy. CABG. Aortic atherosclerosis.    No pleural effusion or pneumothorax.       Telemetry:  Sinus with PVC's

## 2021-10-25 NOTE — PLAN OF CARE
OT: orders received, chart reviewed and discussed with nursing. Per nursing pt not supposed to participate in too much OOB activity at this time. Awaiting angio and just started getting fluid taken off. Decided to hold OT eval until pt is feeling better/more medically stable and able to fully participate in OOB assessment.

## 2021-10-25 NOTE — PLAN OF CARE
VSS, tele SR, IV amio changed to oral and heparin gtt discontinued. Patient denies pain.  Up with SBA, short of breath, but patient reports a little better.  Bumex gtt started, monitoring intake and output closely.  Due to lower output on just bumex, nephrology did add a one time dose of Diuril.  Teds ordered and in room, pt declined to have on now, but will later this evening.  Plan is for angiogram when creat is stable.  Cont to monitor.

## 2021-10-25 NOTE — PLAN OF CARE
Patient alert, SBA to stand to void at bedside. Denies pain, has some VINCENT. One episode of diaphoresis, nausea after standing. EKG done, no sig change, seen by Dr Rain. Symptoms resolved after getting back to bed within 5 minutes. Trop did trend up. IV heparin continues. Amiodarone drip continues at 0.5mg, SR, few PVC's. LFT critically elevated reported to cover provider, US abd ordered. Poor urine out s/p 80mg of lasix Q8 hour. Creat tr up. NPO for angio/ US

## 2021-10-25 NOTE — CONSULTS
Clinical Nutrition Brief Note    Received standard provider order - Heart Failure - Dietitian to instruct patient on 2 gram sodium diet    Met with patient today. He reports a long standing cardiac history and is very familiar with dietary guidelines. He has no questions at this time and denies diet teaching.   Low Sodium handouts x2 left at bedside - patient accepting     Milla Delaney RD, LD  Clinical Dietitian   Unit pager [phone]

## 2021-10-25 NOTE — PROGRESS NOTES
Xcoverage: paged by RN because elevated LFTs; ALT 83-->606, AST 57-->813; total bili is fine; he is admitted for wide complex tachycardia and HFpEF exacerbation; seen by Cardiology, started on Amiodarone and Lasix iv.  - it was felt that elevated transaminase were due to congestive hepatopathy; will check US liver, hold PTA Crestor; avoid hepatotoxic drugs; consider GI consult.  - despite high dose of Lasix iv- RN reports low urine output; cr up from 2.04 to 2.38; defer diuresis to cardiology team.    Carley Rubio MD

## 2021-10-25 NOTE — PROGRESS NOTES
Reviewed with salo Prather seen and examined.  76 year-old white male, presented to the ER with CP and SOB. Found to be in wide QRS tachycardia. ECG tracings reviewed, consistent with LV VT.  Known CAD with CABG 15 years ago and stenting a few years ago. Not following cardiology regularly.  Current EF around 30%.  Troponin elevation up to 3.46. No chest pain in sinus rhythm now.  Creatinine up to 2.38.  Residual SOB at rest.  AST and ALT both elevated.  PMH: AAA, hypertension, SIADH, PAD.  DNR/DNI.    Recommend:  Switch iv to po amiodarone. Monitor AST/ALT. Suspect liver enzyme elevation secondary to CHF. Hope for improvement with CHF treatment.  Continue diuresis while carefully monitor for creatinine changes.  Hold off angiography because of high creatinine and ongoing CHF. Reconsider angiography only if pt has recurrent chest pain or refractory CHF.  No ICD because of DNR/DNI code status.

## 2021-10-25 NOTE — CONSULTS
Nephrology Initial Consult  October 25, 2021      Xavier Giordano MRN:[id number] YOB: 1945  Date of Admission:10/24/2021  Primary care provider: Natty Crook  Requesting physician: Sean Nye MD    ASSESSMENT AND RECOMMENDATIONS:     Xavier Giordano is a 76 year old male with past medical history of coronary artery disease status post CABG in 2005, hypertension, hyperlipidemia, congestive heart failure -admitted on 10/24 with chest pain, shortness of breath, wide-complex tachycardia    1 CKD 3-baseline creatinine around 1.3-1.4.  Creatinine was down to 1.4 on 10/18 after recent PCI.  Now admitted with creatinine of 2 and up to 2.4 with diuresis overnight.  -Creatinine bump in setting of wide-complex tachycardia, unstable hemodynamics, diuresis.  Nonoliguric  -Recent UA without any proteinuria or hematuria    2 coronary artery disease/wide-complex tachycardia-recent PCI earlier this month to SV.  Cardiology holding off angiogram given resolution of chest pain as well as rising creatinine.  -On oral amiodarone  -Troponin trending down    3 heart failure with preserved ejection fraction -got 80 mg IV Lasix yesterday and another 80 mg early this morning.  Bump in creatinine up to 2.4 likely driven by poor renal perfusion wide complex tachycardia. Clinically , he is clearly in CHF and should benefit from diuresis.   Agree with current diuretic regimen.     4 raise LFT-transaminases.  Possibly related to heart failure.  Monitor      Thank you for the consult. Will continue to follow along with you .    Recommendations were communicated to cardiology team in person.       Monae Fletcher MD  [facility] - Nephrology   [phone]        REASON FOR CONSULT: SHAYE on CKD    HISTORY OF PRESENT ILLNESS:  Xavier Giordano is a 76 year old male with past medical history of coronary artery disease status post CABG in 2005, hypertension, hyperlipidemia, congestive heart failure    He was  recently admitted earlier this month with chest pain and nonsustained ventricular tachycardia.  Echocardiogram had shown ejection fraction of 53%, mild to moderate mitral regurgitation, moderate tricuspid regurgitation.  A coronary angiogram was done along with aspiration thrombectomy and angioplasty of saphenous vein graft followed later drug-eluting stent.   Creatinine at baseline runs around 1.4.  It is up to 1.7 during past admission.  Patient was diuresed is inpatient and again started on Lasix 40 mg twice a day as outpatient.    He presented yesterday again with complaints of chest pain, lower extremity edema, dyspnea and orthopnea and was found to have wide-complex tachycardia treated with IV amiodarone drip.  Angiogram was planned for this morning but canceled secondary to rising creatinine and the fact that he is chest pain-free and now in sinus rhythm.    Creatinine on presentation was 2.  It is increased to 2.4 with diuresis overnight.  Sodium of 132.  BUN 49.  Bicarb 19.  Troponin peaked at 3.4 and is now trending down.  Chest x-ray shows mild interstitial prominence.  No pleural effusion.    Is currently saturating well on room air.  Hemodynamically stable. Started on Bumex 0.5 mg/ hr by cardilogy     On eval, he endorses worsening edema over last few days along with scrotal edema , orthopnea which has improved some after starting diuretics here.         PAST MEDICAL HISTORY:  Reviewed with patient on 10/25/2021  and is as listed in HPI.       MEDICATIONS:  PTA Meds  Prior to Admission medications    Medication Sig Last Dose Taking? Auth Provider   acetaminophen (TYLENOL) 325 MG tablet Take 650 mg by mouth every 4 hours as needed for mild pain  at prn Yes Unknown, Entered By History   aspirin 81 MG EC tablet Take 81 mg by mouth daily 10/24/2021 at am Yes Unknown, Entered By History   clopidogrel (PLAVIX) 75 MG tablet Take 75 mg by mouth daily 10/24/2021 at am Yes Unknown, Entered By History   furosemide  (LASIX) 40 MG tablet Take 40 mg by mouth 2 times daily Take at 6 am and noon 10/24/2021 at 0600 Yes Unknown, Entered By History   isosorbide mononitrate (IMDUR) 60 MG 24 hr tablet Take 30 mg by mouth At Bedtime 10/23/2021 at hs Yes Unknown, Entered By History   metoprolol succinate ER (TOPROL-XL) 50 MG 24 hr tablet Take 50 mg by mouth 2 times daily 10/24/2021 at am Yes Unknown, Entered By History   multivitamin w/minerals (THERA-VIT-M) tablet Take 1 tablet by mouth daily 10/23/2021 at am Yes Unknown, Entered By History   nitroGLYcerin (NITROSTAT) 0.4 MG sublingual tablet Place 0.4 mg under the tongue every 5 minutes as needed for chest pain For chest pain place 1 tablet under the tongue every 5 minutes for 3 doses. If symptoms persist 5 minutes after 1st dose call 911.  at prn Yes Unknown, Entered By History   pantoprazole (PROTONIX) 40 MG EC tablet Take 40 mg by mouth 2 times daily 10/24/2021 at am Yes Unknown, Entered By History   rosuvastatin (CRESTOR) 40 MG tablet Take 40 mg by mouth At Bedtime 10/23/2021 at hs Yes Unknown, Entered By History   vitamin C (ASCORBIC ACID) 1000 MG TABS Take 1,000 mg by mouth daily 10/23/2021 at am Yes Unknown, Entered By History   Vitamin D, Cholecalciferol, 25 MCG (1000 UT) CAPS Take 2,000 Units by mouth daily 10/23/2021 at am Yes Unknown, Entered By History      Current Meds    amiodarone  400 mg Oral Daily    Followed by     [START ON 11/1/2021] amiodarone  200 mg Oral Daily     aspirin  325 mg Oral Once    Or     aspirin  243 mg Oral Once     aspirin  81 mg Oral Daily     clopidogrel  75 mg Oral Daily     isosorbide mononitrate  30 mg Oral At Bedtime     metoprolol succinate ER  50 mg Oral BID     pantoprazole  40 mg Oral BID     [Held by provider] rosuvastatin  40 mg Oral At Bedtime     sodium chloride (PF)  3 mL Intracatheter Q8H     Infusion Meds    bumetanide       - MEDICATION INSTRUCTIONS -       - MEDICATION INSTRUCTIONS -       - MEDICATION INSTRUCTIONS -        "ACE/ARB/ARNI NOT PRESCRIBED       sodium chloride         ALLERGIES:    Allergies   Allergen Reactions     Amlodipine Other (See Comments)     Worse leg swelling than when on coreg or metoprolol     Atenolol Other (See Comments)     Carvedilol Other (See Comments)     Sharp calf pain     Ciprofloxacin Other (See Comments)     Patient on metronidazole and ciprofloxacin at same time for diverticulitis.  Developed nausea, headache, loose stool.     Metoprolol Other (See Comments)     Leg pain, darkening toes (?atheroembolic)     Metronidazole Other (See Comments)     Patient on metronidazole and ciprofloxacin at same time for diverticulitis.  Developed nausea, headache, loose stool.     Morphine Other (See Comments)     Chills     Simvastatin Rash     Skin pealing       REVIEW OF SYSTEMS:  A comprehensive of systems was negative except as noted above.    SOCIAL HISTORY:   Reviewed with patient on 10/25/2021     FAMILY MEDICAL HISTORY:   Family History   Problem Relation Age of Onset     Heart Disease Mother          90s of heart condition     Cancer Father          87, cancer     No Known Problems Sister      No Known Problems Brother      Reviewed with patient on 10/25/2021     PHYSICAL EXAM:   Temp  Av.1  F (36.7  C)  Min: 97.2  F (36.2  C)  Max: 98.7  F (37.1  C)      Pulse  Av.6  Min: 55  Max: 156 Resp  Av.9  Min: 12  Max: 30  SpO2  Av.5 %  Min: 83 %  Max: 97 %       /62 (BP Location: Left arm)   Pulse 65   Temp 97.8  F (36.6  C) (Oral)   Resp 22   Ht 1.676 m (5' 6\")   Wt 72.4 kg (159 lb 9.6 oz)   SpO2 95%   BMI 25.76 kg/m     Date 10/25/21 0700 - 10/26/21 0659   Shift 3822-9274 6418-6087 6411-7191 24 Hour Total   INTAKE   P.O. 50   50   Shift Total(mL/kg) 50(0.69)   50(0.69)   OUTPUT   Urine 425   425   Shift Total(mL/kg) 425(5.87)   425(5.87)   Weight (kg) 72.39 72.39 72.39 72.39      Admit Weight: 72.5 kg (159 lb 12.8 oz)     GENERAL APPEARANCE: no distress,  awake  EYES: " no scleral icterus, pupils equal  HENT: NC/AT,  mouth  without ulcers or lesions  Lymphatics: no cervical or supraclavicular LAD  Endo: no moon facies, no goiter  Pulmonary: crackles b/l base  CV: regular rhythm, normal rate, no rub   - JVP +   - Edema +, pitting  GI: soft, nontender,   MS: no evidence of inflammation in joints  SKIN: no rash, warm, dry, no cyanosis  NEURO: face symmetric, no asterixis     LABS:   CMP  Recent Labs   Lab 10/25/21  1013 10/25/21  0244 10/24/21  1601 10/24/21  1038   NA  --  132*  --  132*   POTASSIUM  --  3.9 4.8 3.3*   CHLORIDE  --  102  --  101   CO2  --  19*  --  21   ANIONGAP  --  11  --  10   GLC  --  105*  --  122*   BUN  --  49*  --  43*   CR  --  2.38*  --  2.04*   GFRESTIMATED  --  26*  --  31*   MATTHEW  --  8.1*  --  8.4*   MAG 2.9* 2.8* 3.0* 2.4*   PROTTOTAL  --  6.5*  --  7.3   ALBUMIN  --  2.9*  --  3.2*   BILITOTAL  --  1.0  --  0.9   ALKPHOS  --  149  --  135   AST  --  813*  --  57*   ALT  --  606*  --  83*     CBC  Recent Labs   Lab 10/24/21  1038   HGB 8.5*   WBC 9.5   RBC 2.78*   HCT 26.0*   MCV 94   MCH 30.6   MCHC 32.7   RDW 15.3*        INR  Recent Labs   Lab 10/25/21  0244 10/24/21  1038   INR 1.56* 1.32*       IMAGING:  Personally reviewed the images and findings are as listed in HPI     Monae Fletcher MD

## 2021-10-25 NOTE — PROGRESS NOTES
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       Assessment & Plan       Paroxysmal ventricular tachycardia  He was started on IV amiodarone drip and now switch to oral amiodarone.   Evaluated by Cardiology and EP, no ICD as patient is DNR/DNI.  Potassium now normal, and Magnesium slightly elevated.       NSTEMI  Acute on chronic diastolic congestive heart failure exacerbation (HFpEF)  Coronary artery disease, multivessel, with history of CABG (LIMA to LAD, SVG to 1st diagonal, SVG to OM1) s/p thrombectomy and angioplasty SVG to OM1 10/11/21, ART to SVG to OM1 10/14/21  Hypertension  Dyslipidemia   *Presents with progressive lower extremity edema, orthopnea, dyspnea on exertion.   *CXR with mild interstitial prominence suggestive of CHF, clinically consistent with symptoms  *Recently admitted 10/11-10/16/21 at [facility], with angiogram x2 and stent placement as above  *Diuresed for HFpEF during that admission, though diuretics held due to creatinine rise (though occurring following angiogram, so possibly BREANNE component). Eventually started on furosemide 20 mg daily-> increased to 40 mg BID by PCP as outpatient 10/22.   *Most recent EF 53% on echocardiogram 10/11/21  *Troponin 0.228, similar elevation seen during recent admission, though with report of chest pressure and ventricular arrhythmias new episode of ACS possible     He was started on IV lasix 80 mg TID on admission, now switch to IV Bumex infusion   -  Cardiology consulted  - Troponin trending up, but creatinine increase to 2.38, will need coronary angiogram once creatinine improve.   - Repeat echocardiogram shows EF of 40-45%, inferolateral wall akinesis. Moderate MR   - Telemetry  - Continue prior to admission aspirin, clopidogrel, rosuvastatin, metoprolol XL, defer heparin to Cardiology      Hyponatremia  Sodium 132. 133 at time of discharge 10/18.  Clinically hypervolemic.  - Hyponatremia likely secondary to CHF and poor prognostic factor.      Hypokalemia  On admission K 3.3, now replaced.   - Monitor and replace per protocol      Acute Hepatitis likely secondary to CHF>   AST and ALT elevation, was AST 57, ALT 83. On admission now increase to 813 and 606.     Total bilirubin normal, INR mildly elevated at 1.56.   Concern for congestive hepatopathy in the setting of CHF.  Also was recently started on rosuvastatin, possibly contributing if not resolving with diuresis.   US RUQ done today shows mild non specific gallbladder wall thickening, no gallstone, normal liver and no biliary ductal dilatation, will follow his LFT's   I will check hepatitis b, hepatitis c, ferritin, ceruloplasmin, SCOTT, ASMA, AMA for further work up.      Acute kidney injury on chronic kidney disease, stage 3  Baseline creatinine 1.4-1.6 with GFR in 40s. Creatinine 2.04 on admission. Mild SHAYE with peak of 1.72 10/13/21 during recent admission, possibly due to diuresis though timing consistent with BREANNE from angiogram as well.  Clinically hypervolemic, thus concerning for cardiorenal physiology, creatinine up to 2.32 with diuresis.   Nephrology consulted  - Avoid nephrotoxins  - Daily BMP with diuresis as above, on IV Bumex infusion of 0.5 mg/hr.     Recent acute blood loss anemia  Hemoglobin 8.5 g/dl, stable from recent discharge of 8.7 g/dl.  Was seen by gastroenterology during recent admission however no EGD was recommended and cardiology had attributed his drop in hemoglobin to blood  loss from access site following first angiogram.  - Monitor hemoglobin closely while on heparin drip  - CBC in the AM           Diet:   2 gram sodium restricted diet   DVT Prophylaxis: Heparin drip discontinue by cardiology   Ignacio Catheter: Not present       DVT Prophylaxis: Pneumatic Compression Devices  Code Status: No CPR- Do NOT Intubate    Disposition: Expected discharge in 2-3 days once CHF and renal failure  improves.     Floyd Mckeon MD  Text Page  (7am - 6pm)    Interval History   Patient seen and evaluated today, his breathing is some what better now, denies any chest pain, fever, chills, nausea, vomiting,  Headache or dizziness.     No other significant event overnight.     -Data reviewed today: I reviewed all new labs and imaging results over the last 24 hours. I personally reviewed no images or EKG's today.    Physical Exam   Temp: 98.6  F (37  C) Temp src: Oral BP: 115/61 Pulse: 61   Resp: 18 SpO2: 95 % O2 Device: Nasal cannula Oxygen Delivery: 2 LPM  Vitals:    10/24/21 1600 10/25/21 0148   Weight: 72.5 kg (159 lb 12.8 oz) 72.4 kg (159 lb 9.6 oz)     Vital Signs with Ranges  Temp:  [97.2  F (36.2  C)-98.7  F (37.1  C)] 98.6  F (37  C)  Pulse:  [55-74] 61  Resp:  [18-30] 18  BP: (108-127)/(61-80) 115/61  SpO2:  [83 %-97 %] 95 %  I/O last 3 completed shifts:  In: 884.24 [P.O.:415; I.V.:469.24]  Out: 1450 [Urine:1450]    Constitutional: awake, alert, cooperative, no apparent distress, and appears stated age  Eyes: Lids and lashes normal, pupils equal, round and reactive to light, extra ocular muscles intact, sclera clear, conjunctiva normal  Respiratory: No increased work of breathing, good air exchange, bilateral  Crackles positive, no wheezing  Cardiovascular: Normal apical impulse, regular rate and rhythm, normal S1 and S2, no S3 or S4, and no murmur noted, JVD elevated.   GI: No scars, normal bowel sounds, soft, non-distended, non-tender, no masses palpated, no hepatosplenomegally  Skin: no  bruising or bleeding  Musculoskeletal: 2+ lower extremity pitting edema present  Neurologic: no focal deficit.     Medications     bumetanide 0.5 mg/hr (10/25/21 1201)     - MEDICATION INSTRUCTIONS -       - MEDICATION INSTRUCTIONS -       - MEDICATION INSTRUCTIONS -       ACE/ARB/ARNI NOT PRESCRIBED       sodium chloride         amiodarone  400 mg Oral Daily    Followed by     [START ON 11/1/2021] amiodarone  200 mg Oral Daily     aspirin  325 mg Oral Once    Or     aspirin  243 mg Oral Once     aspirin  81 mg Oral Daily     clopidogrel  75 mg Oral Daily     isosorbide mononitrate  30 mg Oral At Bedtime     metoprolol succinate ER  50 mg Oral BID     pantoprazole  40 mg Oral BID     [Held by provider] rosuvastatin  40 mg Oral At Bedtime     sodium chloride (PF)  3 mL Intracatheter Q8H       Data   Recent Labs   Lab 10/25/21  1013 10/25/21  0244 10/24/21  2007 10/24/21  1601 10/24/21  1038 10/24/21  1038   WBC  --   --   --   --   --  9.5   HGB  --   --   --   --   --  8.5*   MCV  --   --   --   --   --  94   PLT  --   --   --   --   --  272   INR  --  1.56*  --   --   --  1.32*   NA  --  132*  --   --   --  132*   POTASSIUM  --  3.9  --  4.8  --  3.3*   CHLORIDE  --  102  --   --   --  101   CO2  --  19*  --   --   --  21   BUN  --  49*  --   --   --  43*   CR  --  2.38*  --   --   --  2.04*   ANIONGAP  --  11  --   --   --  10   MATTHEW  --  8.1*  --   --   --  8.4*   GLC  --  105*  --   --   --  122*   ALBUMIN  --  2.9*  --   --   --  3.2*   PROTTOTAL  --  6.5*  --   --   --  7.3   BILITOTAL  --  1.0  --   --   --  0.9   ALKPHOS  --  149  --   --   --  135   ALT  --  606*  --   --   --  83*   AST  --  813*  --   --   --  57*   TROPONIN 2.983*  --  3.460* 1.931*   < > 0.228*    < > = values in this interval not displayed.       Recent Results (from the past 24 hour(s))   US Abdomen Limited    Narrative    US ABDOMEN LIMITED 10/25/2021 9:08 AM    CLINICAL HISTORY: elevated LFTs, elevated LFTs  TECHNIQUE: Limited  abdominal ultrasound.    COMPARISON: None.    FINDINGS:    GALLBLADDER: No gallstones. Mild nonspecific wall thickening to 3 mm.  No pericholecystic fluid. Sonographic Ramos sign is negative.    BILE DUCTS: There is no biliary dilatation. The common duct measures  2mm.    LIVER: Normal.    RIGHT KIDNEY: Normal. No hydronephrosis.    PANCREAS: The visualized portions of the pancreas are normal.      Impression    IMPRESSION:  1.  Mild nonspecific gallbladder wall thickening. No gallstones or  evidence for acute cholecystitis.     2.  Normal liver. No biliary ductal dilatation.    ARMIDA VERDUGO MD         SYSTEM ID:  [id number]   Echocardiogram Limited   Result Value    LVEF  40-45%    Narrative    [id number]  [id number]  [id number]  [id number]^TANJA^ADAMARIS^CT     [facility]  Echocardiography Laboratory  [address] [place] [address]     Name: YESENIA ODEN  MRN: [id number]  : 1945  Study Date: 10/25/2021 09:44 AM  Age: 76 yrs  Gender: Male  Patient Location: [facility]  Reason For Study: Heart Failure  Ordering Physician: ADAMARIS AGRAWAL  Referring Physician: Natty Crook  Performed By: Moreno Watson RDCS     BSA: 1.8 m2  Height: 66 in  Weight: 159 lb  HR: 62  BP: 127/62 mmHg  ______________________________________________________________________________  Procedure  Limited Portable Echo Adult. Optison (NDC #[id number]) given intravenously.  ______________________________________________________________________________  Interpretation Summary     Left ventricular systolic function is mildly reduced.  The visual ejection fraction is 40-45%.  There is inferolateral wall akinesis.  There is moderate concentric left ventricular hypertrophy.  The left atrium is moderately dilated.  There is moderate (2+) mitral regurgitation ( ERO 0.25cm2/41 ml)  No old studies available for comparison .  ______________________________________________________________________________  Left  Ventricle  The left ventricle is normal in size. There is moderate concentric left  ventricular hypertrophy. Left ventricular systolic function is mildly reduced.  The visual ejection fraction is 40-45%. Grade II or moderate diastolic  dysfunction. There is inferolateral wall akinesis.     Right Ventricle  The right ventricle is normal in structure, function and size. There is no  mass or thrombus in the right ventricle.     Atria  The left atrium is moderately dilated. Right atrial size is normal. There is  no atrial shunt seen. The left atrial appendage is not well visualized.     Mitral Valve  There is moderate mitral annular calcification. There is moderate (2+) mitral  regurgitation. There is no mitral valve stenosis.     Tricuspid Valve  Normal tricuspid valve. The right ventricular systolic pressure is  approximated at 23.6 mmHg plus the right atrial pressure. Right ventricular  systolic pressure is normal. There is no tricuspid stenosis.     Aortic Valve  There is moderate trileaflet aortic sclerosis. There is mild (1+) aortic  regurgitation. No aortic stenosis is present.     Pulmonic Valve  Normal pulmonic valve. There is no pulmonic valvular regurgitation. There is  no pulmonic valvular stenosis.     Vessels  The aortic root is normal size. Normal size ascending aorta. Inferior vena  cava not well visualized for estimation of right atrial pressure. The  pulmonary artery is normal size.     Pericardium  The pericardium appears normal. There is no pleural effusion.     Rhythm  Sinus rhythm was noted.  ______________________________________________________________________________  MMode/2D Measurements & Calculations  IVSd: 1.6 cm     LVIDd: 5.1 cm  LVIDs: 4.4 cm  LVPWd: 1.3 cm  FS: 13.9 %  LV mass(C)d: 328.6 grams  LV mass(C)dI: 181.1 grams/m2  Ao root diam: 3.1 cm  LA dimension: 5.0 cm  asc Aorta Diam: 2.7 cm  LA/Ao: 1.6     EF(MOD-bp): 32.2 %  LA Volume (BP): 92.0 ml  LA Volume Index (BP): 50.8  ml/m2  RWT: 0.53     Doppler Measurements & Calculations  MV E max jimbo: 116.0 cm/sec  MV A max jimbo: 27.1 cm/sec  MV E/A: 4.3  MV dec time: 0.14 sec  MR ERO: 0.24 cm2  MR volume: 39.0 ml  PA acc time: 0.10 sec  TR max jimbo: 243.0 cm/sec  TR max P.6 mmHg  E/E' av.3  Lateral E/e': 19.3  Medial E/e': 23.3     ______________________________________________________________________________  Report approved by: Dr. Heraclio Mesa 10/25/2021 12:13 PM

## 2021-10-25 NOTE — PROGRESS NOTES
[facility]  EP Cardiology Progress Note  Date of Service: 10/25/2021  Primary Cardiologist: Cruz JIMENEZ Psaquale is a 76 year old male with past medical history significant for CAD s/p CABG (2005), stent in VG (2017) and recent coronary stent placement and aspiration thrombectomy and angioplasty of VG to 1st obtuse marginal (10/2021), PAD with ischemic toe (cholesterol embolism at time of PCI) s/p 5th left toe amputation, rheumatic fever,previous tobacco abuse, and Agent Orange exposure, LE edema admitted on 10/24/2021 with chest pain with no relief from nitroglycerin, lower extremity edema, orthopnea, dyspnea on exertion.   At arrival to the ED patient's ECG showed wide complex tachycardia.  His troponin was at 0.228 admission and trended up to 3.4, potassium 3.3, creatinine 2.04, hgb 8.5, ProBNP was 19,795 at admission      On 10/11, patient was evaluated at [facility] for laceration and found to have a wide complex tachycardia, left [facility] and presented to [facility] with NSTEMI, positive troponin, underwent an angiogram with successful aspiration thrombectomy and angioplasty of the saphenous vein graft to the 1st obtuse marginal, he was given diuretics due to heart failure but not discharged on diuretics due to creatinine of 1.6, his hgb was 7.6 and was given 1 unit PRBC. As an outpatient his lasix was increased to 40 mg twice daily.          Interval History  Pt denies chest pain today but is having shortness of breath  Did have palpitations with VT        Assessment  Coronary artery disease    PTA aspirin, Plavix 75 mg daily, Imdur 60 mg daily, metoprolol XL 50 mg BID, rosuvastatin 40 mg daily    CABG X4 in 2005 with LIMA to the LAD and VG to the D1, OM1 CX and distal RCA    PCI to OM/SVG anastomosis 2017 in setting of NSTEMI    coronary angiogram on 10/2021 revealed multivessel CAD the LIMA to the LAD is patent, 100% stenosis of proximal RCA, 100% proximal LAD, 100% mid LAD,  90% proximal circumflex, and 100% OM1, 90% stenosis in the mid body of the SVG graft to the 1st diagonal, Acute total occlusion in the proximal anastomosis of the SVG graft to the 1st obtuse marginal and had a successful aspiration thrombectomy and angioplasty of the saphenous vein graft to the 1st obtuse marginal.      Pt is currently NPO for possible coronary angiogram.  Will cancel due to elevated creatinine      Has hx of elevated liver enzymes with statins    Wide complex tachycardia    At OSH on 10/11/2021 admission had salvos of PVCs/NSVT quieted post PCI, but returned AM of 10/12/2021; subsequently PVC salvos resolved after initiation of metoprolol    Was given amiodarone bolus and drip started.  Currently at 0.5 mg/hour.      Electrolytes replaced.       Currently taking metoprolol XL 50 mg BID     Pt is DNR/DNI.   Will stop amiodarone gtt and start oral amiodarone     Amiodarone monitoring    TSH=1.6 (10/2021)     ALT/AST=83/57 (10/24/2021) and ALT/XVN=346/813 (10/25/2021)     Daily liver enzymes     Elevated troponin    Likely elevated in the setting VT    troponin repeated now    Anemia    Had black stool on 10/13/2021 and required 1 unit PRBC and hgb increased to 8.6.  Reportedly GI did not feel EGD was needed    Hgb at admission 8.5 (10/24/21)    Heart failure with preserved ejection fraction    ECHO at OSH revealed EF 53% with Inferior wall and posterior WMA, Moderate tricuspid regurgitation, Mildly enlarged left atrium    ECHO pending    Current weight is 159#.  Usual weight 155#.     Received lasix 80 mg IV on 10/24/21 and 10/25/21.  Appreciate general cardiology managing.      CKD    creatinine 2.0 at admission and 2.3 (10/25/2021)     Plan  1. Pt is DNR/DNI.  Recommend stopping amiodarone drip and starting amiodarone 400 mg daily x 7 days then 200 mg daily  2. Repeat ALT/AST daily  3. Discontinue angiogram.           [facility]  Text Page  (M-F 7:30 am - 4:00  pm)      Physical Exam   Temp: 97.8  F (36.6  C) Temp src: Oral BP: 127/62 Pulse: 65   Resp: 22 SpO2: 95 % O2 Device: None (Room air) Oxygen Delivery: 2 LPM  Vitals:    10/24/21 1600 10/25/21 0148   Weight: 72.5 kg (159 lb 12.8 oz) 72.4 kg (159 lb 9.6 oz)       GEN:  In general, this is a well nourished male in no acute distress.  Patient ambulatory.  HEENT:  Pupils normal size, equal, and round. Sclerae nonicteric. Clear oropharynx. Mucous membranes moist,  no cyanosis.  NECK: Supple, no asymmetry, masses, or scars appreciated. Trachea midline.  C/V:  Regular rate and rhythm, + murmur.   RESP: Respirations are labored with use of accessory muscles. crackles  GI: Abdomen soft, nontender, nondistended.   EXTREM: 1+ LE edema. No cyanosis or clubbing.  MS: Large joints without obvious swelling or erythema.   VASC: Radial and dorsalis pedis are normal in volumes and symmetric bilaterally.   NEURO: Alert and oriented, cooperative.No obvious focal deficits.   PSYCH: Normal affect.  SKIN: Warm and dry.     Medications     amiodarone 0.5 mg/min (10/24/21 1852)     - MEDICATION INSTRUCTIONS -       heparin 850 Units/hr (10/24/21 1611)     - MEDICATION INSTRUCTIONS -       - MEDICATION INSTRUCTIONS -       ACE/ARB/ARNI NOT PRESCRIBED       sodium chloride         aspirin  325 mg Oral Once    Or     aspirin  243 mg Oral Once     aspirin  81 mg Oral Daily     clopidogrel  75 mg Oral Daily     furosemide  80 mg Intravenous Q8H     isosorbide mononitrate  30 mg Oral At Bedtime     metoprolol succinate ER  50 mg Oral BID     pantoprazole  40 mg Oral BID     [Held by provider] rosuvastatin  40 mg Oral At Bedtime     sodium chloride (PF)  3 mL Intracatheter Q8H       Data   Most Recent 3 CBC's:Recent Labs   Lab Test 10/24/21  1038   WBC 9.5   HGB 8.5*   MCV 94        Most Recent 3 BMP's:Recent Labs   Lab Test 10/25/21  0244 10/24/21  1601 10/24/21  1038   *  --  132*   POTASSIUM 3.9 4.8 3.3*   CHLORIDE 102  --  101    CO2 19*  --  21   BUN 49*  --  43*   CR 2.38*  --  2.04*   ANIONGAP 11  --  10   MATTHEW 8.1*  --  8.4*   *  --  122*     Most Recent 2 LFT's:Recent Labs   Lab Test 10/25/21  0244 10/24/21  1038   * 57*   * 83*   ALKPHOS 149 135   BILITOTAL 1.0 0.9     Most Recent 3 Troponin's:Recent Labs   Lab Test 10/24/21  2007 10/24/21  1601 10/24/21  1038   TROPONIN 3.460* 1.931* 0.228*     Most Recent 3 BNP's:Recent Labs   Lab Test 10/24/21  1038   NTBNPI 19,795*     Most Recent TSH and T4:Recent Labs   Lab Test 10/24/21  1038   TSH 1.66

## 2021-10-26 ENCOUNTER — APPOINTMENT (OUTPATIENT)
Dept: OCCUPATIONAL THERAPY | Facility: CLINIC | Age: 76
DRG: 280 | End: 2021-10-26
Attending: INTERNAL MEDICINE
Payer: MEDICARE

## 2021-10-26 ENCOUNTER — APPOINTMENT (OUTPATIENT)
Dept: GENERAL RADIOLOGY | Facility: CLINIC | Age: 76
DRG: 280 | End: 2021-10-26
Attending: INTERNAL MEDICINE
Payer: MEDICARE

## 2021-10-26 LAB
ALBUMIN SERPL-MCNC: 3 G/DL (ref 3.4–5)
ALP SERPL-CCNC: 211 U/L (ref 40–150)
ALT SERPL W P-5'-P-CCNC: 801 U/L (ref 0–70)
ANION GAP SERPL CALCULATED.3IONS-SCNC: 12 MMOL/L (ref 3–14)
AST SERPL W P-5'-P-CCNC: 764 U/L (ref 0–45)
BASOPHILS # BLD AUTO: 0 10E3/UL (ref 0–0.2)
BASOPHILS NFR BLD AUTO: 0 %
BILIRUB DIRECT SERPL-MCNC: 0.4 MG/DL (ref 0–0.2)
BILIRUB SERPL-MCNC: 0.9 MG/DL (ref 0.2–1.3)
BUN SERPL-MCNC: 61 MG/DL (ref 7–30)
CALCIUM SERPL-MCNC: 8.5 MG/DL (ref 8.5–10.1)
CHLORIDE BLD-SCNC: 97 MMOL/L (ref 94–109)
CO2 SERPL-SCNC: 24 MMOL/L (ref 20–32)
CREAT SERPL-MCNC: 3.13 MG/DL (ref 0.66–1.25)
EOSINOPHIL # BLD AUTO: 0.1 10E3/UL (ref 0–0.7)
EOSINOPHIL NFR BLD AUTO: 1 %
ERYTHROCYTE [DISTWIDTH] IN BLOOD BY AUTOMATED COUNT: 15.4 % (ref 10–15)
GFR SERPL CREATININE-BSD FRML MDRD: 19 ML/MIN/1.73M2
GLUCOSE BLD-MCNC: 98 MG/DL (ref 70–99)
HBV CORE AB SERPL QL IA: NONREACTIVE
HBV SURFACE AB SERPL IA-ACNC: 0.37 M[IU]/ML
HBV SURFACE AG SERPL QL IA: NONREACTIVE
HCT VFR BLD AUTO: 24 % (ref 40–53)
HCV AB SERPL QL IA: NONREACTIVE
HGB BLD-MCNC: 8.1 G/DL (ref 13.3–17.7)
IMM GRANULOCYTES # BLD: 0 10E3/UL
IMM GRANULOCYTES NFR BLD: 0 %
LYMPHOCYTES # BLD AUTO: 1.3 10E3/UL (ref 0.8–5.3)
LYMPHOCYTES NFR BLD AUTO: 14 %
MAGNESIUM SERPL-MCNC: 2.6 MG/DL (ref 1.6–2.3)
MCH RBC QN AUTO: 30.6 PG (ref 26.5–33)
MCHC RBC AUTO-ENTMCNC: 33.8 G/DL (ref 31.5–36.5)
MCV RBC AUTO: 91 FL (ref 78–100)
MONOCYTES # BLD AUTO: 0.7 10E3/UL (ref 0–1.3)
MONOCYTES NFR BLD AUTO: 8 %
NEUTROPHILS # BLD AUTO: 7.1 10E3/UL (ref 1.6–8.3)
NEUTROPHILS NFR BLD AUTO: 77 %
NRBC # BLD AUTO: 0 10E3/UL
NRBC BLD AUTO-RTO: 0 /100
PHOSPHATE SERPL-MCNC: 4.9 MG/DL (ref 2.5–4.5)
PLATELET # BLD AUTO: 219 10E3/UL (ref 150–450)
POTASSIUM BLD-SCNC: 3.2 MMOL/L (ref 3.4–5.3)
POTASSIUM BLD-SCNC: 3.5 MMOL/L (ref 3.4–5.3)
PROT SERPL-MCNC: 6.6 G/DL (ref 6.8–8.8)
RBC # BLD AUTO: 2.65 10E6/UL (ref 4.4–5.9)
SODIUM SERPL-SCNC: 133 MMOL/L (ref 133–144)
UFH PPP CHRO-ACNC: <0.1 IU/ML
WBC # BLD AUTO: 9.4 10E3/UL (ref 4–11)

## 2021-10-26 PROCEDURE — 97535 SELF CARE MNGMENT TRAINING: CPT | Mod: GO | Performed by: OCCUPATIONAL THERAPIST

## 2021-10-26 PROCEDURE — 84100 ASSAY OF PHOSPHORUS: CPT | Performed by: INTERNAL MEDICINE

## 2021-10-26 PROCEDURE — 210N000002 HC R&B HEART CARE

## 2021-10-26 PROCEDURE — 80053 COMPREHEN METABOLIC PANEL: CPT | Performed by: NURSE PRACTITIONER

## 2021-10-26 PROCEDURE — 250N000013 HC RX MED GY IP 250 OP 250 PS 637: Performed by: INTERNAL MEDICINE

## 2021-10-26 PROCEDURE — 36415 COLL VENOUS BLD VENIPUNCTURE: CPT | Performed by: INTERNAL MEDICINE

## 2021-10-26 PROCEDURE — 250N000009 HC RX 250: Performed by: NURSE PRACTITIONER

## 2021-10-26 PROCEDURE — 97110 THERAPEUTIC EXERCISES: CPT | Mod: GO | Performed by: OCCUPATIONAL THERAPIST

## 2021-10-26 PROCEDURE — 99232 SBSQ HOSP IP/OBS MODERATE 35: CPT | Performed by: INTERNAL MEDICINE

## 2021-10-26 PROCEDURE — 99233 SBSQ HOSP IP/OBS HIGH 50: CPT | Performed by: INTERNAL MEDICINE

## 2021-10-26 PROCEDURE — 97166 OT EVAL MOD COMPLEX 45 MIN: CPT | Mod: GO | Performed by: OCCUPATIONAL THERAPIST

## 2021-10-26 PROCEDURE — 84132 ASSAY OF SERUM POTASSIUM: CPT | Performed by: INTERNAL MEDICINE

## 2021-10-26 PROCEDURE — 71046 X-RAY EXAM CHEST 2 VIEWS: CPT

## 2021-10-26 PROCEDURE — 82248 BILIRUBIN DIRECT: CPT | Performed by: INTERNAL MEDICINE

## 2021-10-26 PROCEDURE — 85520 HEPARIN ASSAY: CPT | Performed by: INTERNAL MEDICINE

## 2021-10-26 PROCEDURE — 83735 ASSAY OF MAGNESIUM: CPT | Performed by: INTERNAL MEDICINE

## 2021-10-26 PROCEDURE — 85025 COMPLETE CBC W/AUTO DIFF WBC: CPT | Performed by: INTERNAL MEDICINE

## 2021-10-26 RX ORDER — AMIODARONE HYDROCHLORIDE 200 MG/1
200 TABLET ORAL DAILY
Status: DISCONTINUED | OUTPATIENT
Start: 2021-10-26 | End: 2021-10-28 | Stop reason: HOSPADM

## 2021-10-26 RX ORDER — POTASSIUM CHLORIDE 1500 MG/1
20 TABLET, EXTENDED RELEASE ORAL ONCE
Status: COMPLETED | OUTPATIENT
Start: 2021-10-26 | End: 2021-10-26

## 2021-10-26 RX ADMIN — METOPROLOL SUCCINATE 50 MG: 50 TABLET, EXTENDED RELEASE ORAL at 09:55

## 2021-10-26 RX ADMIN — PANTOPRAZOLE SODIUM 40 MG: 40 TABLET, DELAYED RELEASE ORAL at 09:54

## 2021-10-26 RX ADMIN — ISOSORBIDE MONONITRATE 30 MG: 30 TABLET, EXTENDED RELEASE ORAL at 21:36

## 2021-10-26 RX ADMIN — METOPROLOL SUCCINATE 50 MG: 50 TABLET, EXTENDED RELEASE ORAL at 21:36

## 2021-10-26 RX ADMIN — ASPIRIN 81 MG: 81 TABLET, COATED ORAL at 09:54

## 2021-10-26 RX ADMIN — PANTOPRAZOLE SODIUM 40 MG: 40 TABLET, DELAYED RELEASE ORAL at 21:36

## 2021-10-26 RX ADMIN — POTASSIUM CHLORIDE 20 MEQ: 1500 TABLET, EXTENDED RELEASE ORAL at 06:51

## 2021-10-26 RX ADMIN — BUMETANIDE 0.5 MG/HR: 0.25 INJECTION INTRAMUSCULAR; INTRAVENOUS at 02:22

## 2021-10-26 RX ADMIN — ZOLPIDEM TARTRATE 2.5 MG: 5 TABLET, FILM COATED ORAL at 21:36

## 2021-10-26 RX ADMIN — AMIODARONE HYDROCHLORIDE 200 MG: 200 TABLET ORAL at 09:54

## 2021-10-26 RX ADMIN — CLOPIDOGREL BISULFATE 75 MG: 75 TABLET ORAL at 09:54

## 2021-10-26 ASSESSMENT — MIFFLIN-ST. JEOR: SCORE: 1388.09

## 2021-10-26 ASSESSMENT — ACTIVITIES OF DAILY LIVING (ADL)
ADLS_ACUITY_SCORE: 9
ADLS_ACUITY_SCORE: 9
ADLS_ACUITY_SCORE: 8
ADLS_ACUITY_SCORE: 9
ADLS_ACUITY_SCORE: 8
ADLS_ACUITY_SCORE: 8
ADLS_ACUITY_SCORE: 9
ADLS_ACUITY_SCORE: 8
ADLS_ACUITY_SCORE: 8
ADLS_ACUITY_SCORE: 9
ADLS_ACUITY_SCORE: 8
ADLS_ACUITY_SCORE: 9
PREVIOUS_RESPONSIBILITIES: MEAL PREP;HOUSEKEEPING;LAUNDRY;SHOPPING;MEDICATION MANAGEMENT;FINANCES;DRIVING
ADLS_ACUITY_SCORE: 8
ADLS_ACUITY_SCORE: 9
ADLS_ACUITY_SCORE: 8
ADLS_ACUITY_SCORE: 8

## 2021-10-26 NOTE — PROGRESS NOTES
"  Cardiology Progress Note          Assessment and Plan:   Sinus rate is about 60 bpm.  He states feeling better.  No more VT so far.  AST/ALT remains significantly elevated. Agree for GI consultation. Amiodarone is reduced to 200 mg daily. Will accept occasional nonsustained VT before amiodarone reaches full effect.  Low potassium. Agree for replacement.  Creatinine is up. He has lost about 5 lb since the admission. May hold off iv Bumex.    76 year-old white male, presented to the ER with CP and SOB. Found to be in wide QRS tachycardia. ECG tracings reviewed, consistent with LV VT.  Known CAD with CABG 15 years ago and stenting a few years ago. Not following cardiology regularly.  Current EF is reported 40%.  Troponin elevation up to 3.46. No chest pain in sinus rhythm now.  PMH: AAA, hypertension, SIADH, PAD.  DNR/DNI.     Recommend:  Continue po amiodarone.   No ICD because of DNR/DNI code status.  Cristian Freeman MD  Cardiology   [phone]                  Physical Exam:   Blood pressure 128/63, pulse 62, temperature 98.7  F (37.1  C), temperature source Oral, resp. rate 18, height 1.676 m (5' 6\"), weight 70.5 kg (155 lb 8 oz), SpO2 95 %.  Wt Readings from Last 4 Encounters:   10/26/21 70.5 kg (155 lb 8 oz)   10/11/21 70.3 kg (155 lb)      I/O last 3 completed shifts:  In: 310.4 [P.O.:275; I.V.:35.4]  Out: 3000 [Urine:3000]    Constitutional: Alert, no apparent distress,      Lungs: No crackles or wheezing,    Cardiovascular: Regular rhythm, normal S1 and S2, and no murmur,    Lymphm node  Neck  ENT  Neurologic  Abdomen: No enlargement  No jugular vein extension or carotid bruit  No apparent abnormality  No focal deficit  Normal bowel sounds, soft, no distension, no tender   Skin: No rashes, no cyanosis   Extremity: No edema          Medications:     Current Facility-Administered Medications:      acetaminophen (TYLENOL) tablet 650 mg, 650 mg, Oral, Q6H PRN **OR** acetaminophen (TYLENOL) Suppository 650 mg, 650 " mg, Rectal, Q6H PRN, Sean Nye MD     amiodarone (PACERONE) tablet 200 mg, 200 mg, Oral, Daily, Cristian Freeman MD     aspirin (ASA) tablet 325 mg, 325 mg, Oral, Once **OR** aspirin (ASA) chewable tablet 243 mg, 243 mg, Oral, Once, Alfredo Agudelo MD     aspirin EC tablet 81 mg, 81 mg, Oral, Daily, Sean Nye MD, 81 mg at 10/25/21 0933     bisacodyl (DULCOLAX) Suppository 10 mg, 10 mg, Rectal, Daily PRN, Sean Nye MD     bumetanide (BUMEX) 0.25 mg/mL infusion, 0.5 mg/hr, Intravenous, Continuous, Tonya Stubbs APRN CNP, Last Rate: 2 mL/hr at 10/26/21 0222, 0.5 mg/hr at 10/26/21 0222     clopidogrel (PLAVIX) tablet 75 mg, 75 mg, Oral, Daily, Sean Nye MD, 75 mg at 10/25/21 0933     Continuing beta blocker from home medication list OR beta blocker order already placed during this visit, , Does not apply, DOES NOT GO TO MAR, Sean Nye MD     HOLD: nitroGLYcerin IF, , Does not apply, HOLD, Sena Nye MD     hydrALAZINE (APRESOLINE) injection 10 mg, 10 mg, Intravenous, Q4H PRN, Sean Nye MD     isosorbide mononitrate (IMDUR) 24 hr tablet 30 mg, 30 mg, Oral, At Bedtime, Sean Nye MD, 30 mg at 10/25/21 2045     lidocaine (LMX4) cream, , Topical, Q1H PRN, Sean Nye MD     lidocaine 1 % 0.1-1 mL, 0.1-1 mL, Other, Q1H PRN, Sean Nye MD     LORazepam (ATIVAN) injection 0.5 mg, 0.5 mg, Intravenous, Q2H PRN **OR** LORazepam (ATIVAN) tablet 0.5 mg, 0.5 mg, Oral, Q2H PRN, Alfredo Agudelo MD     melatonin tablet 1 mg, 1 mg, Oral, At Bedtime PRN, Sean Nye MD, 1 mg at 10/24/21 5951     metoprolol succinate ER (TOPROL-XL) 24 hr tablet 50 mg, 50 mg, Oral, BID, Sean Nye MD, 50 mg at 10/25/21 2045     ondansetron (ZOFRAN-ODT) ODT tab 4 mg, 4 mg, Oral, Q6H PRN **OR** ondansetron (ZOFRAN) injection 4 mg, 4 mg, Intravenous, Q6H PRN, Sean Nye MD     pantoprazole  (PROTONIX) EC tablet 40 mg, 40 mg, Oral, BID, Sean Nye MD, 40 mg at 10/25/21 2045     Patient is already receiving anticoagulation with heparin, enoxaparin (LOVENOX), warfarin (COUMADIN)  or other anticoagulant medication, , Does not apply, Continuous PRN, Sean Nye MD     Patient is NOT allergic to contrast dye OR was given pre-procedure oral steroids for treatment, , Does not apply, DOES NOT GO TO Magnus MADISON Prajwal, MD     polyethylene glycol (MIRALAX) Packet 17 g, 17 g, Oral, Daily PRN, Sean Nye MD     potassium chloride ER (KLOR-CON M) CR tablet 20 mEq, 20 mEq, Oral, Once PRN, Alfredo Agudelo MD     prochlorperazine (COMPAZINE) injection 5 mg, 5 mg, Intravenous, Q6H PRN **OR** prochlorperazine (COMPAZINE) tablet 5 mg, 5 mg, Oral, Q6H PRN **OR** prochlorperazine (COMPAZINE) suppository 12.5 mg, 12.5 mg, Rectal, Q12H PRN, Sean Nye MD     Reason ACE/ARB/ARNI order not selected, , Other, DOES NOT GO TO Parris MADISON Ian Michael, MD     [Held by provider] rosuvastatin (CRESTOR) tablet 40 mg, 40 mg, Oral, At Bedtime, Sean Nye MD, 40 mg at 10/24/21 2107     senna-docusate (SENOKOT-S/PERICOLACE) 8.6-50 MG per tablet 1 tablet, 1 tablet, Oral, BID PRN **OR** senna-docusate (SENOKOT-S/PERICOLACE) 8.6-50 MG per tablet 2 tablet, 2 tablet, Oral, BID PRN, Sean Nye MD     sodium chloride (PF) 0.9% PF flush 3 mL, 3 mL, Intracatheter, Q8H, Alfredo Agudelo MD, 3 mL at 10/25/21 1204     sodium chloride (PF) 0.9% PF flush 3 mL, 3 mL, Intracatheter, Q1H PRN, Alfredo Agudelo MD     sodium chloride (PF) 0.9% PF flush 3 mL, 3 mL, Intracatheter, q1 min prn, Alfredo Agudelo MD     sodium chloride 0.9% infusion, , Intravenous, Continuous, Alfredo Agudelo MD     zolpidem (AMBIEN) half-tab 2.5 mg, 2.5 mg, Oral, At Bedtime PRN, Floyd Mckeon MD, 2.5 mg at 10/25/21 2045           Lab results:        Recent Labs   Lab Test 10/26/21  0554 10/25/21  9506  10/24/21  1601 10/24/21  1038 10/24/21  1038    132*  --   --  132*   POTASSIUM 3.2* 3.9 4.8   < > 3.3*   CHLORIDE 97 102  --   --  101   MATTHEW 8.5 8.1*  --   --  8.4*   CO2 24 19*  --   --  21   BUN 61* 49*  --   --  43*   CR 3.13* 2.38*  --   --  2.04*   GLC 98 105*  --   --  122*    < > = values in this interval not displayed.     Recent Labs   Lab Test 10/26/21  0554 10/24/21  1038   HGB 8.1* 8.5*   WBC 9.4 9.5    272     Recent Labs   Lab Test 10/25/21  0244 10/24/21  1038   INR 1.56* 1.32*     Recent Labs   Lab Test 10/25/21  1013 10/24/21  2007 10/24/21  1601   TROPONIN 2.983* 3.460* 1.931*

## 2021-10-26 NOTE — PROGRESS NOTES
[facility]    Cardiology Progress Note    Date of Service: 10/26/2021     Assessment & Plan   Xavier Giordano is a 76 year old male with past medical history of CAD, CABG in 2005, CKD recent PCI at [facility], melena with hemoglobin drop recently at [facility], hyponatremia on that admission and Hydrochlorothiazide stopped, HTN, PAD (AAA), dyslipidemia, NSVT.  This patient was admitted on 10/24/2021 with symptoms of VINCENT, palpitations, and chest pressure.  DNR/DNI status    1. Nonstemi  HX CABG 2005(LIMA to LAD, SVG to first diagonal, SVG to OM1    Admitted to ED here 10-11 for laceration and left AMA as needed to take dog to vet and find care for his wife, then went to [facility] where he underwent PCI  -angiography X2     First demonstrated known  of the LAD and the right coronary artery.    - new 90% lesion of the proximal circumflex compared compared with the angiogram done in 2017.    -saphenous vein graft to the obtuse marginal branch was occluded with intraluminal thrombus.    - saphenous vein graft to the diagonal branch had a 90% degenerated lesion in the middle segment, but had REINA 3 flow.    -The LIMA to LAD was patent, supplying the LAD that was also filling the PDA via mainly septal collaterals.   Had SVG OM-2 thrombectomey with 48 hours of Eptfibatide then returned for-->  -PCI to SVG of  with ART (Xience Isamar rx 3.5 x 33mm), resolute [place] 3.5mm x 38mm, Xience 3.25mm x 38mm Xience and Xience 3.25 X 38mm  -staged diagonal PCI planned at [facility] to [place]  He also suffered a right groin ooze; left groin is clean; no hum or bruit on either side  Troponin peak 3.4    PLAN:  He states his chest pain on admission was more related to his tachycardia and not his previous chest pain PCI  HOLD ON ANGIOGRAM due to SHAYE  Continue DAPT nitrates and betablockers      2. CKD with worsening creatinine   Creatinine at [facility] appeared to  have baseline of 1.4-1.5  Admitted here at 2.04-->2.38--> 3.13 today  Uncertain what his dye load was  May be cardiorenal   4 days post contrast load his creatinine at his PMD was 1.4  Appreciate Nephrology consult  bumex drip 0.5mg per hour with marginal response  One dose IV diuril yesterday      3. HFrEF and diastolic dysfunction    BNP 19,795  Echo at [facility] on 10-11 showed EF 53% with WMA in inferior and posterior walls  Aortic sclerosis with mild to moderate AI, moderate MR and moderate TR  Echo-->EF 40-45% in inferolateral HK; moderate LVH with grade II diastolic dysfunction and moderate MR, mild AI  CXR  with mild interstitial prominence  Weight down 4 pounds  I/O net - 2.6 liters yesterday  K 3.2, replacing  Less edema; JVP still up and crackles 1/3 up and scrotal edema improved by 50%  Will await Nephrology input regarding Bumex dosing    4. Anemia with two days of Melena at [facility]  GI consult reportedly done at [facility] who did not recommend EGD  And thought hemoglobin drop in part was post instrumentation to right groin  mcv normal; platelets normal  Previous hemoglobins 11-12  8.1 today with elevated ferritin to 1561 MCV 91    4. Wide complex tachycardia  IV amiodarone changed to oral  EP following  Sinus now with PVC's  No ICD due to DNR/DNI status    5 acute elevation of Liver enzymes and elevated INR  ast 57--> 813-->765  Alt 83-->606-->801  Crestor on hold  Ultrasound shows normal liver    May be some component of passive congestion but RV function read as normal on echo at [facility]      6. Hypertension  Hydrochlorothiazide stopped recently due to low sodium  Sodium 133, up from 126 at [facility]    7. Dyslipidemia  Crestor on hold currently due to liver enzyme elevation   in March    8. Social issues  He cares for his wife with dementia who is currently hospitalized and his sister is assiting    Amy Stubbs, MSN, APRN, CNP  [facility]      Interval History   Less SOB  Ongoing edema but  better  Scrotal edema reduced by 50%      Review of Systems:  The Review of Systems is negative other than noted in the HPI    Physical Exam   Temp: 98.7  F (37.1  C) Temp src: Oral BP: 128/63 Pulse: 62   Resp: 18 SpO2: 95 % O2 Device: Nasal cannula Oxygen Delivery: 2 LPM  Vitals:    10/24/21 1600 10/25/21 0148 10/26/21 0506   Weight: 72.5 kg (159 lb 12.8 oz) 72.4 kg (159 lb 9.6 oz) 70.5 kg (155 lb 8 oz)     Vital Signs with Ranges  Temp:  [97.5  F (36.4  C)-98.7  F (37.1  C)] 98.7  F (37.1  C)  Pulse:  [58-62] 62  Resp:  [17-26] 18  BP: (115-128)/(60-65) 128/63  SpO2:  [95 %-97 %] 95 %  I/O last 3 completed shifts:  In: 310.4 [P.O.:275; I.V.:35.4]  Out: 3000 [Urine:3000]    Constitutional     alert and oriented, in no acute distress.     Skin     warm and dry to touch    ENT     ++pallor or cyanosis    Neck    Supple, JVP elevated no carotid bruit    Chest     no tenderness to palpation     Lungs  Crackles 1/3 up    Cardiac  regular rhythm, S1 normal, S2 normal, No S3 or S4, 2 separate systolic murmurs RUSB and apex to axilla    Abdomen     abdomen soft, bowel sounds normoactive, no hepatosplenomegaly    Extremities and Back     no clubbing, cyanosis. 1+ pitting edema   Scrotal swelling      Neurological     no gross motor deficits noted, affect appropriate, oriented to time, person and place.        Medications     bumetanide 0.5 mg/hr (10/26/21 0222)     - MEDICATION INSTRUCTIONS -       - MEDICATION INSTRUCTIONS -       - MEDICATION INSTRUCTIONS -       ACE/ARB/ARNI NOT PRESCRIBED       sodium chloride         amiodarone  400 mg Oral Daily    Followed by     [START ON 11/1/2021] amiodarone  200 mg Oral Daily     aspirin  325 mg Oral Once    Or     aspirin  243 mg Oral Once     aspirin  81 mg Oral Daily     clopidogrel  75 mg Oral Daily     isosorbide mononitrate  30 mg Oral At Bedtime     metoprolol succinate ER  50 mg Oral BID     pantoprazole  40 mg Oral BID     [Held by provider] rosuvastatin  40 mg Oral At  Bedtime     sodium chloride (PF)  3 mL Intracatheter Q8H          Data:     ROUTINE IP LABS (Last four results)  BMP  Recent Labs   Lab 10/26/21  0554 10/25/21  0244 10/24/21  1601 10/24/21  1038    132*  --  132*   POTASSIUM 3.2* 3.9 4.8 3.3*   CHLORIDE 97 102  --  101   MATTHEW 8.5 8.1*  --  8.4*   CO2 24 19*  --  21   BUN 61* 49*  --  43*   CR 3.13* 2.38*  --  2.04*   GLC 98 105*  --  122*     CHOLESTEROL/HEPATIC  Recent Labs   Lab 10/26/21  0554 10/25/21  0244 10/24/21  1038   * 606* 83*   * 813* 57*     CBC  Recent Labs   Lab 10/26/21  0554 10/24/21  1038 10/24/21  1038   WBC 9.4  --  9.5   RBC 2.65*  --  2.78*   HGB 8.1*   < > 8.5*   HCT 24.0*  --  26.0*   MCV 91  --  94   MCH 30.6  --  30.6   MCHC 33.8  --  32.7   RDW 15.4*  --  15.3*     --  272    < > = values in this interval not displayed.     TROP: No lab results found in last 7 days.   BNP:    Recent Labs   Lab 10/24/21  1038   NTBNPI 19,795*     INR  Recent Labs   Lab 10/25/21  0244 10/24/21  1038   INR 1.56* 1.32*     TSH   Date Value Ref Range Status   10/24/2021 1.66 0.40 - 4.00 mU/L Final       EKG results:  Reviewed if available     Imaging:  Recent Results (from the past 24 hour(s))   US Abdomen Limited    Narrative    US ABDOMEN LIMITED 10/25/2021 9:08 AM    CLINICAL HISTORY: elevated LFTs, elevated LFTs  TECHNIQUE: Limited abdominal ultrasound.    COMPARISON: None.    FINDINGS:    GALLBLADDER: No gallstones. Mild nonspecific wall thickening to 3 mm.  No pericholecystic fluid. Sonographic Ramos sign is negative.    BILE DUCTS: There is no biliary dilatation. The common duct measures  2mm.    LIVER: Normal.    RIGHT KIDNEY: Normal. No hydronephrosis.    PANCREAS: The visualized portions of the pancreas are normal.      Impression    IMPRESSION:  1.  Mild nonspecific gallbladder wall thickening. No gallstones or  evidence for acute cholecystitis.     2.  Normal liver. No biliary ductal dilatation.    ARMIDA VERDUGO MD          SYSTEM ID:  [id number]   Echocardiogram Limited   Result Value    LVEF  40-45%    Narrative    [id number]  [id number]  [id number]  [id number]^TANJA^ADAMARIS^CT     [facility]  Echocardiography Laboratory  [facility] [place] [address]     Name: YESENIA ODEN  MRN: [id number]  : 1945  Study Date: 10/25/2021 09:44 AM  Age: 76 yrs  Gender: Male  Patient Location: [facility]  Reason For Study: Heart Failure  Ordering Physician: ADAMARIS AGRAWAL  Referring Physician: Natty Crook  Performed By: Moreno Watson RDCS     BSA: 1.8 m2  Height: 66 in  Weight: 159 lb  HR: 62  BP: 127/62 mmHg  ______________________________________________________________________________  Procedure  Limited Portable Echo Adult. Optison (NDC #[id number]) given intravenously.  ______________________________________________________________________________  Interpretation Summary     Left ventricular systolic function is mildly reduced.  The visual ejection fraction is 40-45%.  There is inferolateral wall akinesis.  There is moderate concentric left ventricular hypertrophy.  The left atrium is moderately dilated.  There is moderate (2+) mitral regurgitation ( ERO 0.25cm2/41 ml)  No old studies available for comparison .  ______________________________________________________________________________  Left Ventricle  The left ventricle is normal in size. There is moderate concentric left  ventricular hypertrophy. Left ventricular systolic function is mildly reduced.  The visual ejection fraction is 40-45%. Grade II or moderate diastolic  dysfunction. There is inferolateral wall akinesis.     Right Ventricle  The right ventricle is normal in structure, function and size. There is no  mass or thrombus in the right ventricle.     Atria  The left atrium is moderately dilated. Right atrial size is normal. There is  no atrial shunt seen. The left atrial appendage is not well visualized.     Mitral Valve  There is moderate  mitral annular calcification. There is moderate (2+) mitral  regurgitation. There is no mitral valve stenosis.     Tricuspid Valve  Normal tricuspid valve. The right ventricular systolic pressure is  approximated at 23.6 mmHg plus the right atrial pressure. Right ventricular  systolic pressure is normal. There is no tricuspid stenosis.     Aortic Valve  There is moderate trileaflet aortic sclerosis. There is mild (1+) aortic  regurgitation. No aortic stenosis is present.     Pulmonic Valve  Normal pulmonic valve. There is no pulmonic valvular regurgitation. There is  no pulmonic valvular stenosis.     Vessels  The aortic root is normal size. Normal size ascending aorta. Inferior vena  cava not well visualized for estimation of right atrial pressure. The  pulmonary artery is normal size.     Pericardium  The pericardium appears normal. There is no pleural effusion.     Rhythm  Sinus rhythm was noted.  ______________________________________________________________________________  MMode/2D Measurements & Calculations  IVSd: 1.6 cm     LVIDd: 5.1 cm  LVIDs: 4.4 cm  LVPWd: 1.3 cm  FS: 13.9 %  LV mass(C)d: 328.6 grams  LV mass(C)dI: 181.1 grams/m2  Ao root diam: 3.1 cm  LA dimension: 5.0 cm  asc Aorta Diam: 2.7 cm  LA/Ao: 1.6     EF(MOD-bp): 32.2 %  LA Volume (BP): 92.0 ml  LA Volume Index (BP): 50.8 ml/m2  RWT: 0.53     Doppler Measurements & Calculations  MV E max jimbo: 116.0 cm/sec  MV A max jimbo: 27.1 cm/sec  MV E/A: 4.3  MV dec time: 0.14 sec  MR ERO: 0.24 cm2  MR volume: 39.0 ml  PA acc time: 0.10 sec  TR max jimbo: 243.0 cm/sec  TR max P.6 mmHg  E/E' av.3  Lateral E/e': 19.3  Medial E/e': 23.3     ______________________________________________________________________________  Report approved by: Dr. Heraclio Mesa 10/25/2021 12:13 PM           Telemetry:  Sinus rhythm

## 2021-10-26 NOTE — PLAN OF CARE
A&O x4. Pt denied pain. VSS, weaned to RA. Up w/ SBA. Tele: SR BBB 1st degree AVB.  Ambulated in hernandez x2 w/ RN. K+ recheck @ 1100 WDL, next in the am. Alarms on. Plan for possible PO diuretics tomorrow. Continue to monitor.

## 2021-10-26 NOTE — PLAN OF CARE
VSS on 2L NC. Tele: SR/SB with 1st degree AVB, HR 50's-60's. VINCENT, denies SOB at rest. Denies chest pain. Bumex infusing at 0.5 mg/hour, adequate urine output. Weight trending down. Potassium of 3.2 replaced this AM per protocol, recheck in place for 1100. Creatinine trending up. Plan to continue dieresis efforts.

## 2021-10-26 NOTE — PROGRESS NOTES
Nephrology Progress Note  10/26/2021         Assessment & Recommendations:   Xavier Giordano is a 76 year old male with past medical history of coronary artery disease status post CABG in 2005, hypertension, hyperlipidemia, congestive heart failure -admitted on 10/24 with chest pain, shortness of breath, wide-complex tachycardia     1 CKD 3-baseline creatinine around 1.3-1.4.  Creatinine was down to 1.4 on 10/18 after recent PCI.  Now admitted with creatinine of 2 and up to 3.1 with diuresis overnight.  -Creatinine bump in setting of wide-complex tachycardia, unstable hemodynamics, diuresis.  Nonoliguric  -Recent UA without any proteinuria or hematuria     2 coronary artery disease/wide-complex tachycardia-recent PCI earlier this month to SVG.  Cardiology holding off angiogram given resolution of chest pain as well as rising creatinine.  -On oral amiodarone  -Troponin trending down  -Noted no plans for ICD given DNR/DNI status     3 heart failure with preserved ejection fraction - Clinically , he is clearly in CHF and has significant improvement in symptoms with diuresis.  Creatinine however has trended up with brisk diuresis.    -We will hold Bumex drip for the rest of the day.  Plan to switch to oral diuretics tomorrow.     4 raise LFT-transaminases.  Possibly related to heart failure.  Monitor.  Amiodarone dose decreased      Monae Fletcher MD  [facility] - Nephrology   [phone]      Interval History :   Seen / examined.   Reports feeling better.  Off oxygen.  Leg swelling and scrotal swelling is significantly lower.  Good diuresis with addition of Diuril to Bumex drip.  Weight down by 5 pounds.  Potassium 3.2.  Creatinine has increased to 3.1.  BUN 61.    Review of Systems:   A 4 point review of systems was negative except as noted above.  Notably: fair appetite. no nausea or vomiting or diarrhea.  no confusion,  no fever or chills    Physical Exam:   I/O last 3 completed shifts:  In: 310.4  [P.O.:275; I.V.:35.4]  Out: 3000 [Urine:3000]    GENERAL APPEARANCE: no distress,  awake  Endo: no moon facies, no goiter  Pulmonary: crackles b/l base  CV: regular rhythm, normal rate, no rub   - JVP +   - Edema +, pitting.  Scrotal edema plus  GI: soft, nontender,   MS: no evidence of inflammation in joints  SKIN: no rash, warm, dry, no cyanosis  NEURO: face symmetric, no asterixis        Labs:   All labs reviewed by me  Electrolytes/Renal - Recent Labs   Lab Test 10/26/21  0554 10/25/21  1013 10/25/21  0244 10/24/21  1601 10/24/21  1601 10/24/21  1038 10/24/21  1038     --  132*  --   --   --  132*   POTASSIUM 3.2*  --  3.9  --  4.8   < > 3.3*   CHLORIDE 97  --  102  --   --   --  101   CO2 24  --  19*  --   --   --  21   BUN 61*  --  49*  --   --   --  43*   CR 3.13*  --  2.38*  --   --   --  2.04*   GLC 98  --  105*  --   --   --  122*   MATTHEW 8.5  --  8.1*  --   --   --  8.4*   MAG 2.6* 2.9* 2.8*   < > 3.0*   < > 2.4*   PHOS 4.9*  --   --   --   --   --   --     < > = values in this interval not displayed.       CBC -   Recent Labs   Lab Test 10/26/21  0554 10/24/21  1038   WBC 9.4 9.5   HGB 8.1* 8.5*    272       LFTs -   Recent Labs   Lab Test 10/26/21  0554 10/25/21  0244 10/24/21  1038   ALKPHOS 211* 149 135   BILITOTAL 0.9 1.0 0.9   * 606* 83*   * 813* 57*   PROTTOTAL 6.6* 6.5* 7.3   ALBUMIN 3.0* 2.9* 3.2*       Iron Panel -   Recent Labs   Lab Test 10/25/21  1013   JACQUELINE 1,561*           Current Medications:    amiodarone  200 mg Oral Daily     aspirin  325 mg Oral Once    Or     aspirin  243 mg Oral Once     aspirin  81 mg Oral Daily     clopidogrel  75 mg Oral Daily     isosorbide mononitrate  30 mg Oral At Bedtime     metoprolol succinate ER  50 mg Oral BID     pantoprazole  40 mg Oral BID     [Held by provider] rosuvastatin  40 mg Oral At Bedtime     sodium chloride (PF)  3 mL Intracatheter Q8H       bumetanide 0.5 mg/hr (10/26/21 0222)     - MEDICATION INSTRUCTIONS -       -  MEDICATION INSTRUCTIONS -       - MEDICATION INSTRUCTIONS -       ACE/ARB/ARNI NOT PRESCRIBED       sodium chloride       Monae Fletcher MD

## 2021-10-26 NOTE — PROGRESS NOTES
10/26/21 1040   Quick Adds   Type of Visit Initial Occupational Therapy Evaluation   Living Environment   People in home spouse   Current Living Arrangements house   Home Accessibility stairs to enter home;stairs within home   Number of Stairs, Main Entrance 4   Number of Stairs, Within Home, Primary   (15 basement laundry. )   Transportation Anticipated car, drives self   Living Environment Comments was caring for his wife who has dementia, but getting to be too much and now she's in the hospital and will need LTC?   Self-Care   Regular Exercise   (very busy around the house with caring for wife and his dog,)   Activity/Exercise/Self-Care Comment Reports everything is fine at home, has a cane if needed.    Instrumental Activities of Daily Living (IADL)   Previous Responsibilities meal prep;housekeeping;laundry;shopping;medication management;finances;driving   IADL Comments Pt's neighbor doing yard work, etc. pt was caring for his wife who has dementia but getting to be too much.    Disability/Function   Describe hearing loss bilateral hearing loss   Use of hearing assistive devices none   Fall history within last six months yes   Number of times patient has fallen within last six months 1  (stumbled in dark getting his wife into bed. )   General Information   Onset of Illness/Injury or Date of Surgery 10/24/21   Referring Physician Sean Nye MD   Patient/Family Therapy Goal Statement (OT) home   Additional Occupational Profile Info/Pertinent History of Current Problem Xavier Giordano is a 76 year old male with past medical history of CAD, CABG in 2005, CKD recent PCI at [facility], melena with hemoglobin drop recently at [facility], hyponatremia on that admission and Hydrochlorothiazide stopped, HTN, PAD (AAA), dyslipidemia, NSVT.  This patient was admitted on 10/24/2021 with symptoms of VINCENT, palpitations, and chest pressure. troponin elevation but angio not planned, HFr EF, EF  40%. Admitted to ED here 10-11 for laceration and left AMA as needed to take dog to vet and find care for his wife, then went to [facility] where he underwent PCI   Existing Precautions/Restrictions fall   Cognitive Status Examination   Orientation Status orientation to person, place and time   Affect/Mental Status (Cognitive) WFL   Follows Commands WFL   Cognitive Status Comments cont to monitor   Visual Perception   Visual Impairment/Limitations corrective lenses for reading   Sensory   Sensory Quick Adds No deficits were identified   Pain Assessment   Patient Currently in Pain No   Range of Motion Comprehensive   Comment, General Range of Motion B UE AROM WFL   Bed Mobility   Supine-Sit [place] (Bed Mobility) independent   Sit-Supine [place] (Bed Mobility) independent   Transfer Skill: Bed to Chair/Chair to Bed   Bed-Chair [place] (Transfers) contact guard   Assistive Device (Bed-Chair Transfers)   (pushing IV pole)   Sit-Stand Transfer   Sit-Stand [place] (Transfers) supervision   Lower Body Dressing Assessment   [place] Level (Lower Body Dressing) independent   Clinical Impression   Criteria for Skilled Therapeutic Interventions Met (OT) yes   OT Diagnosis impaired safety with ADL/IADL's   OT Problem List-Impairments impacting ADL problems related to;activity tolerance impaired;balance  (swelling in B feet/legs )   Assessment of Occupational Performance 1-3 Performance Deficits   Identified Performance Deficits impaired I with shower transfer, strenuous IADl's ie shopping, cleaning, laundry, etc   Planned Therapy Interventions (OT) ADL retraining;cognition;transfer training;home program guidelines;progressive activity/exercise;risk factor education   Clinical Decision Making Complexity (OT) moderate complexity   Therapy Frequency (OT) Daily   Predicted Duration of Therapy 4 days   Risk & Benefits of therapy have been explained evaluation/treatment results reviewed;care  "plan/treatment goals reviewed;risks/benefits reviewed;current/potential barriers reviewed;participants voiced agreement with care plan;participants included;patient   OT Discharge Planning    OT Discharge Recommendation (DC Rec) home with outpatient cardiac rehab;Home with assist   OT Rationale for DC Rec Anticipate pt will be able to return home with A with strenuous IADL's ie shopping, cooking, cleaning, laundry, etc and OP CR at [facility] for monitored progressive exercise and risk factor education and modification due to recent angio.    OT Brief overview of current status  pt ambulated approx 4 mins with SBA for safety pushing IV pole for balance due to sorenss and weakness in LE\"s and swelling. BP blunted. pt denies SOB. see vs flow sheet for details.    Total Evaluation Time (Minutes)   Total Evaluation Time (Minutes) 10     "

## 2021-10-26 NOTE — PROGRESS NOTES
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       Assessment & Plan       Paroxysmal ventricular tachycardia  He was started on IV amiodarone drip and now switch to oral amiodarone, dose decrease today because of elevated  LFT's  Evaluated by Cardiology and EP, no ICD as patient is DNR/DNI.  Potassium now normal, and Magnesium slightly elevated.   Keep on electrolytes replacement protocol.       NSTEMI  Acute on chronic diastolic congestive heart failure exacerbation (HFpEF)  Coronary artery disease, multivessel, with history of CABG (LIMA to LAD, SVG to 1st diagonal, SVG to OM1) s/p thrombectomy and angioplasty SVG to OM1 10/11/21, ART to SVG to OM1 10/14/21  Hypertension  Dyslipidemia   *Presents with progressive lower extremity edema, orthopnea, dyspnea on exertion.   *CXR with mild interstitial prominence suggestive of CHF, clinically consistent with symptoms  *Recently admitted 10/11-10/16/21 at [facility], with angiogram x2 and stent placement as above  *Diuresed for HFpEF during that admission, though diuretics held due to creatinine rise (though occurring following angiogram, so possibly BREANNE component). Eventually started on furosemide 20 mg daily-> increased to 40 mg BID by PCP as outpatient 10/22.   *Most recent EF 53% on echocardiogram 10/11/21  *Troponin 0.228, similar elevation seen during recent admission, though with report of chest pressure and ventricular arrhythmias new episode of ACS possible      He was started on IV lasix 80 mg TID on admission, now switch to IV Bumex infusion   - Cardiology consulted  - Troponin trending up, but creatinine increase to 3.13, will need coronary angiogram once creatinine improve.   - Repeat echocardiogram shows EF of 40-45%, inferolateral wall akinesis. Moderate MR   - Telemetry  - Continue prior to admission aspirin, clopidogrel, rosuvastatin, metoprolol XL, defer heparin to Cardiology   He has lost about 4 Ibs since admission, good urine out put about 3 liter in last 24 hrs, Bumex infusion hold by the Nephrology as this time LE edema improve, but still fluid overloaded at this time.      Hyponatremia  Sodium 132. 133 at time of discharge 10/18.  Clinically hypervolemic.  - Hyponatremia likely secondary to CHF and poor prognostic factor, improve with diuresis today      Hypokalemia  On admission K 3.3, now replaced.   - Monitor and replace per protocol      Acute Hepatitis likely secondary to Amiodarone with element of CHF as well.   AST and ALT elevation, was AST 57, ALT 83. On admission now increase to 813 and 606, continue to worse  Now ALT 10X its baseline and AST >10x its baseline, I think this happen after IV amiodarone and quite elevated.    Total bilirubin normal, INR mildly elevated at 1.56.  Concern for congestive hepatopathy in the setting of CHF but I think this is more likely secondary to amiodarone.    US RUQ done today shows mild non specific gallbladder wall thickening, no gallstone, normal liver and no biliary ductal dilatation, will follow his LFT's    hepatitis b, hepatitis c, ferritin, ceruloplasmin, SCOTT, ASMA, AMA send and pending at this time.     Amiodarone dose decrease by EP today, but if LFT's continue to worse consider stopping the amiodarone. Consult GI to evaluate the patient.        Acute kidney injury on chronic kidney disease, stage 3  Baseline creatinine 1.4-1.6 with GFR in 40s. Creatinine 2.04 on admission. Mild SHAYE with peak of 1.72  10/13/21 during recent admission, possibly due to diuresis though timing consistent with BREANNE from angiogram as well.  Clinically hypervolemic, thus concerning for cardiorenal physiology, creatinine up to 2.32 with diuresis.   Nephrology consulted  - Avoid nephrotoxins  - Daily BMP with diuresis as above, on IV Bumex infusion of 0.5 mg/hr  But creatinine worsening, that was expected with IV diuresis, Nephrology has hold the infusion of Bumex today.      Recent acute blood loss anemia  Hemoglobin 8.5 g/dl, stable from recent discharge of 8.7 g/dl.  Was seen by gastroenterology during recent admission however no EGD was recommended and cardiology had attributed his drop in hemoglobin to blood loss from access site following first angiogram.  - Monitor hemoglobin closely while on heparin drip  - CBC in the AM           Diet:   2 gram sodium restricted diet   DVT Prophylaxis: Heparin drip discontinue by cardiology   Ignacio Catheter: Not present       Discussed with patient, Dr Dai and the nursing staff taking care of the patient today.     DVT Prophylaxis: Pneumatic Compression Devices  Code Status: No CPR- Do NOT Intubate    Disposition: Expected discharge in 2-3 days once CHF and renal failure  improves.     Floyd Mckeon MD  Text Page  (7am - 6pm)    Interval History   Patient feeling better, SOB is improve, LE edema improving as well, make good urine about 3 liter out with bumex infusion.   No fever, chills, chest pain, headache or dizziness at this time.     No other significant event overnight.     -Data reviewed today: I reviewed all new labs and imaging results over the last 24 hours. I personally reviewed no images or EKG's today.    Physical Exam   Temp: 97.7  F (36.5  C) Temp src: Oral BP: 116/55 (post ex OT) Pulse: 69   Resp: 18 SpO2: 94 % O2 Device: Nasal cannula Oxygen Delivery: 2 LPM  Vitals:    10/24/21 1600 10/25/21 0148 10/26/21 0506   Weight: 72.5 kg (159 lb 12.8 oz) 72.4 kg (159 lb 9.6 oz) 70.5 kg  (155 lb 8 oz)     Vital Signs with Ranges  Temp:  [97.5  F (36.4  C)-98.7  F (37.1  C)] 97.7  F (36.5  C)  Pulse:  [58-69] 69  Resp:  [17-26] 18  BP: (116-128)/(55-65) 116/55  SpO2:  [91 %-97 %] 94 %  I/O last 3 completed shifts:  In: 310.4 [P.O.:275; I.V.:35.4]  Out: 3000 [Urine:3000]    Constitutional: awake, alert, cooperative, no apparent distress, and appears stated age  Eyes: Lids and lashes normal, pupils equal, round and reactive to light, extra ocular muscles intact, sclera clear, conjunctiva normal  Respiratory: No increased work of breathing, good air exchange, bilateral  Crackles positive, no wheezing  Cardiovascular: Normal apical impulse, regular rate and rhythm, normal S1 and S2, no S3 or S4, and no murmur noted, JVD still  elevated.   GI: No scars, normal bowel sounds, soft, non-distended, non-tender, no masses palpated, no hepatosplenomegally  Skin: no bruising or bleeding  Musculoskeletal: 1+ lower extremity pitting edema present  Neurologic: no focal deficit.     Medications     - MEDICATION INSTRUCTIONS -       - MEDICATION INSTRUCTIONS -       - MEDICATION INSTRUCTIONS -       ACE/ARB/ARNI NOT PRESCRIBED       sodium chloride         amiodarone  200 mg Oral Daily     aspirin  325 mg Oral Once    Or     aspirin  243 mg Oral Once     aspirin  81 mg Oral Daily     clopidogrel  75 mg Oral Daily     isosorbide mononitrate  30 mg Oral At Bedtime     metoprolol succinate ER  50 mg Oral BID     pantoprazole  40 mg Oral BID     [Held by provider] rosuvastatin  40 mg Oral At Bedtime     sodium chloride (PF)  3 mL Intracatheter Q8H       Data   Recent Labs   Lab 10/26/21  1116 10/26/21  0554 10/25/21  1013 10/25/21  0244 10/24/21  2007 10/24/21  1601 10/24/21  1038 10/24/21  1038   WBC  --  9.4  --   --   --   --   --  9.5   HGB  --  8.1*  --   --   --   --   --  8.5*   MCV  --  91  --   --   --   --   --  94   PLT  --  219  --   --   --   --   --  272   INR  --   --   --  1.56*  --   --   --  1.32*   NA   --  133  --  132*  --   --   --  132*   POTASSIUM 3.5 3.2*  --  3.9  --  4.8   < > 3.3*   CHLORIDE  --  97  --  102  --   --   --  101   CO2  --  24  --  19*  --   --   --  21   BUN  --  61*  --  49*  --   --   --  43*   CR  --  3.13*  --  2.38*  --   --   --  2.04*   ANIONGAP  --  12  --  11  --   --   --  10   MATTHEW  --  8.5  --  8.1*  --   --   --  8.4*   GLC  --  98  --  105*  --   --   --  122*   ALBUMIN  --  3.0*  --  2.9*  --   --    < > 3.2*   PROTTOTAL  --  6.6*  --  6.5*  --   --    < > 7.3   BILITOTAL  --  0.9  --  1.0  --   --    < > 0.9   ALKPHOS  --  211*  --  149  --   --    < > 135   ALT  --  801*  --  606*  --   --    < > 83*   AST  --  764*  --  813*  --   --    < > 57*   TROPONIN  --   --  2.983*  --  3.460* 1.931*   < > 0.228*    < > = values in this interval not displayed.       No results found for this or any previous visit (from the past 24 hour(s)).

## 2021-10-26 NOTE — CONSULTS
[facility]  Gastroenterology Consultation         Xavier Giordano  [address] [facility] [id number]  76 year old male    Admission Date/Time: 10/24/2021  Primary Care Provider: Natty Crook  Referring / Attending Physician:  Dr. Floyd Mckeon    We were asked to see the patient in consultation by Dr. Floyd Mckeon for evaluation of elevated LFTs.    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     GI consulted for elevated LFTs on 10/26/21. AST/ALT slightly elevated on admission. They increased significantly the next day. Patient reports that he was recently started on Crestor upon discharge from [facility] a little over a week ago. He reports history of significantly elevated LFTs from statins about 30 years ago and at that time was told not to use them. He was found to be anemic on admission but denies melena, hematochezia or hematochezia. He denies abdominal pain. RUQ ultrasound showed mild nonspecific gallbladder wall thickening. No gallstones or evidence for acute cholecystitis. Normal liver. No biliary ductal dilatation.    Interim Visit (admission day 2): patient denies abdominal pain, high-risk behavior for hepatitis B/C, denies alcohol use     ROS: A comprehensive ten point review of systems was negative aside from those in mentioned in the HPI.      PAST MED HX:  I have reviewed this patient's medical history and updated it with pertinent information if needed.   Past Medical History:   Diagnosis Date     Abdominal aortic aneurysm (AAA) (H)      Acute  diastolic congestive heart failure (H)      Anemia due to blood loss, acute      Coronary artery disease     Status post CABG 2005, LIMA to LAD, SVG to D1, OM1, Cx, distal RCA; ART to SVG OM1     HTN (hypertension)      NSTEMI (non-ST elevated myocardial infarction) (H)      Paroxysmal ventricular tachycardia (H)      Peripheral arterial disease (H)      SIADH (syndrome of inappropriate ADH production) (H)        MEDICATIONS:   Prior to Admission Medications   Prescriptions Last Dose Informant Patient Reported? Taking?   Vitamin D, Cholecalciferol, 25 MCG (1000 UT) CAPS 10/23/2021 at am Self Yes Yes   Sig: Take 2,000 Units by mouth daily   acetaminophen (TYLENOL) 325 MG tablet  at prn Self Yes Yes   Sig: Take 650 mg by mouth every 4 hours as needed for mild pain   aspirin 81 MG EC tablet 10/24/2021 at am Self Yes Yes   Sig: Take 81 mg by mouth daily   clopidogrel (PLAVIX) 75 MG tablet 10/24/2021 at am Self Yes Yes   Sig: Take 75 mg by mouth daily   furosemide (LASIX) 40 MG tablet 10/24/2021 at 0600 Self Yes Yes   Sig: Take 40 mg by mouth 2 times daily Take at 6 am and noon   isosorbide mononitrate (IMDUR) 60 MG 24 hr tablet 10/23/2021 at hs Self Yes Yes   Sig: Take 30 mg by mouth At Bedtime   metoprolol succinate ER (TOPROL-XL) 50 MG 24 hr tablet 10/24/2021 at am Self Yes Yes   Sig: Take 50 mg by mouth 2 times daily   multivitamin w/minerals (THERA-VIT-M) tablet 10/23/2021 at am Self Yes Yes   Sig: Take 1 tablet by mouth daily   nitroGLYcerin (NITROSTAT) 0.4 MG sublingual tablet  at prn Self Yes Yes   Sig: Place 0.4 mg under the tongue every 5 minutes as needed for chest pain For chest pain place 1 tablet under the tongue every 5 minutes for 3 doses. If symptoms persist 5 minutes after 1st dose call 911.   pantoprazole (PROTONIX) 40 MG EC tablet 10/24/2021 at am Self Yes Yes   Sig: Take 40 mg by mouth 2 times daily   rosuvastatin (CRESTOR) 40 MG tablet 10/23/2021 at hs Self Yes Yes   Sig: Take 40 mg by mouth At  Bedtime   vitamin C (ASCORBIC ACID) 1000 MG TABS 10/23/2021 at am Self Yes Yes   Sig: Take 1,000 mg by mouth daily      Facility-Administered Medications: None       ALLERGIES:   Allergies   Allergen Reactions     Amlodipine Other (See Comments)     Worse leg swelling than when on coreg or metoprolol     Atenolol Other (See Comments)     Carvedilol Other (See Comments)     Sharp calf pain     Ciprofloxacin Other (See Comments)     Patient on metronidazole and ciprofloxacin at same time for diverticulitis.  Developed nausea, headache, loose stool.     Metoprolol Other (See Comments)     Leg pain, darkening toes (?atheroembolic)     Metronidazole Other (See Comments)     Patient on metronidazole and ciprofloxacin at same time for diverticulitis.  Developed nausea, headache, loose stool.     Morphine Other (See Comments)     Chills     Simvastatin Rash     Skin pealing       SOCIAL HISTORY:  Social History     Tobacco Use     Smoking status: Former Smoker     Types: Cigarettes   Substance Use Topics     Alcohol use: Not Currently     Drug use: Never       FAMILY HISTORY:  Family History   Problem Relation Age of Onset     Heart Disease Mother          90s of heart condition     Cancer Father          87, cancer     No Known Problems Sister      No Known Problems Brother        PHYSICAL EXAM:   Vital Signs with Ranges  Temp: 98.7  F (37.1  C) Temp src: Oral BP: 128/63 Pulse: 62   Resp: 18 SpO2: 95 % O2 Device: Nasal cannula Oxygen Delivery: 2 LPM  I/O last 3 completed shifts:  In: 310.4 [P.O.:275; I.V.:35.4]  Out: 3000 [Urine:3000]    Constitutional: Awake, alert, cooperative, no apparent distress. He is hard of hearing.  Eyes: Conjunctiva and pupils examined and normal.  HEENT: Moist mucous membranes, normal dentition.  Respiratory: Clear to auscultation bilaterally, no crackles or wheezing.  Cardiovascular: Regular rate and rhythm, normal S1 and S2, and no murmur noted.  GI: Soft, non-distended, non-tender,  normal bowel sounds.  Lymph/Hematologic: No anterior cervical or supraclavicular adenopathy.  Skin: No rashes, no cyanosis, no edema.  Musculoskeletal: No joint swelling, erythema or tenderness.  Neurologic: Cranial nerves 2-12 intact, normal strength and sensation.  Psychiatric: Alert, oriented to person, place and time, no obvious anxiety or depression.    ADDITIONAL COMMENTS:   I reviewed the patient's new clinical lab test results.   Recent Labs   Lab Test 10/26/21  0554 10/25/21  0244 10/24/21  1038   WBC 9.4  --  9.5   HGB 8.1*  --  8.5*   MCV 91  --  94     --  272   INR  --  1.56* 1.32*     Recent Labs   Lab Test 10/26/21  0554 10/25/21  0244 10/24/21  1601 10/24/21  1038 10/24/21  1038   POTASSIUM 3.2* 3.9 4.8   < > 3.3*   CHLORIDE 97 102  --   --  101   CO2 24 19*  --   --  21   BUN 61* 49*  --   --  43*   ANIONGAP 12 11  --   --  10    < > = values in this interval not displayed.     Recent Labs   Lab Test 10/26/21  0554 10/25/21  0244 10/24/21  1038   ALBUMIN 3.0* 2.9* 3.2*   BILITOTAL 0.9 1.0 0.9   * 606* 83*   * 813* 57*       I reviewed the patient's new imaging results.    CONSULTATION ASSESSMENT AND PLAN:      Active Problems:   Elevated liver function tests; DDX: drug-induced hepatitis (statin vs other), viral hepatitis, less likely alcoholic hepatitis vs other    Paroxysmal ventricular tachycardia (H)   Acute diastolic congestive heart failure  CAD  HTN  SIADH  Anemia   NSTEMI    Acute kidney injury      PLAN:  - Daily LFTs (ordered)  - Continue pantoprazole 40 mg BID  - Agree with holding Crestor  - GI will continue to follow as inpatient  - Tatyana BARRERA appreciates the consult.    [facility].  Office: [phone]  Cell : [phone]

## 2021-10-27 ENCOUNTER — APPOINTMENT (OUTPATIENT)
Dept: OCCUPATIONAL THERAPY | Facility: CLINIC | Age: 76
DRG: 280 | End: 2021-10-27
Payer: MEDICARE

## 2021-10-27 ENCOUNTER — APPOINTMENT (OUTPATIENT)
Dept: SPEECH THERAPY | Facility: CLINIC | Age: 76
DRG: 280 | End: 2021-10-27
Attending: INTERNAL MEDICINE
Payer: MEDICARE

## 2021-10-27 LAB
ALBUMIN SERPL-MCNC: 2.9 G/DL (ref 3.4–5)
ALP SERPL-CCNC: 199 U/L (ref 40–150)
ALT SERPL W P-5'-P-CCNC: 703 U/L (ref 0–70)
ANION GAP SERPL CALCULATED.3IONS-SCNC: 9 MMOL/L (ref 3–14)
AST SERPL W P-5'-P-CCNC: 419 U/L (ref 0–45)
BASOPHILS # BLD AUTO: 0 10E3/UL (ref 0–0.2)
BASOPHILS NFR BLD AUTO: 0 %
BILIRUB DIRECT SERPL-MCNC: 0.4 MG/DL (ref 0–0.2)
BILIRUB SERPL-MCNC: 1 MG/DL (ref 0.2–1.3)
BUN SERPL-MCNC: 70 MG/DL (ref 7–30)
CALCIUM SERPL-MCNC: 8.4 MG/DL (ref 8.5–10.1)
CHLORIDE BLD-SCNC: 96 MMOL/L (ref 94–109)
CO2 SERPL-SCNC: 26 MMOL/L (ref 20–32)
CREAT SERPL-MCNC: 3.39 MG/DL (ref 0.66–1.25)
EOSINOPHIL # BLD AUTO: 0.2 10E3/UL (ref 0–0.7)
EOSINOPHIL NFR BLD AUTO: 2 %
ERYTHROCYTE [DISTWIDTH] IN BLOOD BY AUTOMATED COUNT: 15.4 % (ref 10–15)
GFR SERPL CREATININE-BSD FRML MDRD: 17 ML/MIN/1.73M2
GLUCOSE BLD-MCNC: 97 MG/DL (ref 70–99)
HCT VFR BLD AUTO: 24.2 % (ref 40–53)
HGB BLD-MCNC: 8.1 G/DL (ref 13.3–17.7)
IMM GRANULOCYTES # BLD: 0.1 10E3/UL
IMM GRANULOCYTES NFR BLD: 1 %
LYMPHOCYTES # BLD AUTO: 1.3 10E3/UL (ref 0.8–5.3)
LYMPHOCYTES NFR BLD AUTO: 14 %
MAGNESIUM SERPL-MCNC: 2.7 MG/DL (ref 1.6–2.3)
MCH RBC QN AUTO: 30.5 PG (ref 26.5–33)
MCHC RBC AUTO-ENTMCNC: 33.5 G/DL (ref 31.5–36.5)
MCV RBC AUTO: 91 FL (ref 78–100)
MITOCHONDRIA M2 IGG SER-ACNC: 1.1 U/ML
MONOCYTES # BLD AUTO: 0.9 10E3/UL (ref 0–1.3)
MONOCYTES NFR BLD AUTO: 10 %
NEUTROPHILS # BLD AUTO: 7 10E3/UL (ref 1.6–8.3)
NEUTROPHILS NFR BLD AUTO: 73 %
NRBC # BLD AUTO: 0 10E3/UL
NRBC BLD AUTO-RTO: 0 /100
PHOSPHATE SERPL-MCNC: 4.3 MG/DL (ref 2.5–4.5)
PLATELET # BLD AUTO: 185 10E3/UL (ref 150–450)
POTASSIUM BLD-SCNC: 3 MMOL/L (ref 3.4–5.3)
POTASSIUM BLD-SCNC: 3.3 MMOL/L (ref 3.4–5.3)
POTASSIUM BLD-SCNC: 3.4 MMOL/L (ref 3.4–5.3)
PROT SERPL-MCNC: 6.7 G/DL (ref 6.8–8.8)
RBC # BLD AUTO: 2.66 10E6/UL (ref 4.4–5.9)
SMA IGG SER-ACNC: 15 UNITS
SODIUM SERPL-SCNC: 131 MMOL/L (ref 133–144)
WBC # BLD AUTO: 9.5 10E3/UL (ref 4–11)

## 2021-10-27 PROCEDURE — 99233 SBSQ HOSP IP/OBS HIGH 50: CPT | Performed by: INTERNAL MEDICINE

## 2021-10-27 PROCEDURE — 250N000013 HC RX MED GY IP 250 OP 250 PS 637: Performed by: STUDENT IN AN ORGANIZED HEALTH CARE EDUCATION/TRAINING PROGRAM

## 2021-10-27 PROCEDURE — 250N000013 HC RX MED GY IP 250 OP 250 PS 637: Performed by: INTERNAL MEDICINE

## 2021-10-27 PROCEDURE — 84450 TRANSFERASE (AST) (SGOT): CPT | Performed by: PHYSICIAN ASSISTANT

## 2021-10-27 PROCEDURE — 85025 COMPLETE CBC W/AUTO DIFF WBC: CPT | Performed by: INTERNAL MEDICINE

## 2021-10-27 PROCEDURE — 210N000002 HC R&B HEART CARE

## 2021-10-27 PROCEDURE — 84155 ASSAY OF PROTEIN SERUM: CPT | Performed by: PHYSICIAN ASSISTANT

## 2021-10-27 PROCEDURE — 80069 RENAL FUNCTION PANEL: CPT | Performed by: INTERNAL MEDICINE

## 2021-10-27 PROCEDURE — 84075 ASSAY ALKALINE PHOSPHATASE: CPT | Performed by: PHYSICIAN ASSISTANT

## 2021-10-27 PROCEDURE — 84132 ASSAY OF SERUM POTASSIUM: CPT | Performed by: INTERNAL MEDICINE

## 2021-10-27 PROCEDURE — 82247 BILIRUBIN TOTAL: CPT | Performed by: PHYSICIAN ASSISTANT

## 2021-10-27 PROCEDURE — 82248 BILIRUBIN DIRECT: CPT | Performed by: PHYSICIAN ASSISTANT

## 2021-10-27 PROCEDURE — 92610 EVALUATE SWALLOWING FUNCTION: CPT | Mod: GN | Performed by: SPEECH-LANGUAGE PATHOLOGIST

## 2021-10-27 PROCEDURE — 36415 COLL VENOUS BLD VENIPUNCTURE: CPT | Performed by: INTERNAL MEDICINE

## 2021-10-27 PROCEDURE — 84100 ASSAY OF PHOSPHORUS: CPT | Performed by: INTERNAL MEDICINE

## 2021-10-27 PROCEDURE — 92526 ORAL FUNCTION THERAPY: CPT | Mod: GN | Performed by: SPEECH-LANGUAGE PATHOLOGIST

## 2021-10-27 PROCEDURE — 99232 SBSQ HOSP IP/OBS MODERATE 35: CPT | Performed by: INTERNAL MEDICINE

## 2021-10-27 PROCEDURE — 97110 THERAPEUTIC EXERCISES: CPT | Mod: GO | Performed by: OCCUPATIONAL THERAPIST

## 2021-10-27 PROCEDURE — 83735 ASSAY OF MAGNESIUM: CPT | Performed by: INTERNAL MEDICINE

## 2021-10-27 RX ORDER — POTASSIUM CHLORIDE 1500 MG/1
40 TABLET, EXTENDED RELEASE ORAL ONCE
Status: COMPLETED | OUTPATIENT
Start: 2021-10-27 | End: 2021-10-27

## 2021-10-27 RX ORDER — POTASSIUM CHLORIDE 1500 MG/1
20 TABLET, EXTENDED RELEASE ORAL ONCE
Status: COMPLETED | OUTPATIENT
Start: 2021-10-27 | End: 2021-10-27

## 2021-10-27 RX ORDER — POTASSIUM CHLORIDE 1500 MG/1
40 TABLET, EXTENDED RELEASE ORAL ONCE
Status: DISCONTINUED | OUTPATIENT
Start: 2021-10-27 | End: 2021-10-27

## 2021-10-27 RX ORDER — POTASSIUM CHLORIDE 1500 MG/1
20 TABLET, EXTENDED RELEASE ORAL DAILY
Status: DISCONTINUED | OUTPATIENT
Start: 2021-10-28 | End: 2021-10-28 | Stop reason: HOSPADM

## 2021-10-27 RX ADMIN — ASPIRIN 81 MG: 81 TABLET, COATED ORAL at 07:31

## 2021-10-27 RX ADMIN — POTASSIUM CHLORIDE 40 MEQ: 1500 TABLET, EXTENDED RELEASE ORAL at 07:30

## 2021-10-27 RX ADMIN — POTASSIUM CHLORIDE 20 MEQ: 1500 TABLET, EXTENDED RELEASE ORAL at 21:21

## 2021-10-27 RX ADMIN — AMIODARONE HYDROCHLORIDE 200 MG: 200 TABLET ORAL at 09:39

## 2021-10-27 RX ADMIN — POTASSIUM CHLORIDE 20 MEQ: 1500 TABLET, EXTENDED RELEASE ORAL at 12:37

## 2021-10-27 RX ADMIN — CLOPIDOGREL BISULFATE 75 MG: 75 TABLET ORAL at 07:31

## 2021-10-27 RX ADMIN — POTASSIUM CHLORIDE 20 MEQ: 1500 TABLET, EXTENDED RELEASE ORAL at 14:44

## 2021-10-27 RX ADMIN — METOPROLOL SUCCINATE 50 MG: 50 TABLET, EXTENDED RELEASE ORAL at 21:21

## 2021-10-27 RX ADMIN — METOPROLOL SUCCINATE 50 MG: 50 TABLET, EXTENDED RELEASE ORAL at 07:31

## 2021-10-27 RX ADMIN — ZOLPIDEM TARTRATE 2.5 MG: 5 TABLET, FILM COATED ORAL at 21:21

## 2021-10-27 RX ADMIN — ISOSORBIDE MONONITRATE 30 MG: 30 TABLET, EXTENDED RELEASE ORAL at 21:21

## 2021-10-27 RX ADMIN — PANTOPRAZOLE SODIUM 40 MG: 40 TABLET, DELAYED RELEASE ORAL at 21:22

## 2021-10-27 RX ADMIN — PANTOPRAZOLE SODIUM 40 MG: 40 TABLET, DELAYED RELEASE ORAL at 07:30

## 2021-10-27 ASSESSMENT — ACTIVITIES OF DAILY LIVING (ADL)
ADLS_ACUITY_SCORE: 8
DEPENDENT_IADLS:: INDEPENDENT
ADLS_ACUITY_SCORE: 8

## 2021-10-27 ASSESSMENT — MIFFLIN-ST. JEOR: SCORE: 1369.04

## 2021-10-27 NOTE — PLAN OF CARE
A&Ox4. VSS on RA. Some VINCENT, pt reports this is improving. Tele: SR/SB with BBB, 1st degree AVB, and long QT. Mild edema to BLE and scrotum. Possible PO diuretics today, monitor kidney function and LFT's closely.

## 2021-10-27 NOTE — PROGRESS NOTES
10/27/21 1128   General Information   Onset of Illness/Injury or Date of Surgery 10/24/21   Referring Physician Dr. Mckeon   Patient/Family Therapy Goal Statement (SLP) Patient reports no problems with swallowing.   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    General Observations Agreeable to evaluation but not happy about it.    Past History of Dysphagia No documented history found.    Type of Evaluation   Type of Evaluation Swallow Evaluation   Oral Motor   Oral Musculature generally intact   Structural Abnormalities none present   Mucosal Quality good   Dentition (Oral Motor)   Dentition (Oral Motor) natural dentition;adequate dentition   Facial Symmetry (Oral Motor)   Facial Symmetry (Oral Motor) WNL   Lip Function (Oral Motor)   Lip Range of Motion (Oral Motor) WNL   Tongue Function (Oral Motor)   Tongue ROM (Oral Motor) WNL   Tongue Coordination/Speed (Oral Motor) WNL   Jaw Function (Oral Motor)   Jaw Function (Oral Motor) WNL   Cough/Swallow/Gag Reflex (Oral Motor)   Soft Palate/Velum (Oral Motor) WNL   Volitional Throat Clear/Cough (Oral Motor) WNL   Vocal Quality/Secretion Management (Oral Motor)   Vocal Quality (Oral Motor) WFL   General Swallowing Observations   Current Diet/Method of Nutritional Intake (General Swallowing Observations, NIS) regular diet;thin liquids (level 0)   Respiratory Support (General Swallowing Observations) none   Swallowing Evaluation Clinical swallow evaluation   Clinical Swallow Evaluation   Feeding Assistance no  assistance needed   Clinical Swallow Evaluation Textures Trialed thin liquids;pureed;solid foods   Clinical Swallow Eval: Thin Liquid Texture Trial   Mode of Presentation, Thin Liquids cup;straw;self-fed   Volume of Liquid or Food Presented 4 oz of water   Oral Phase of Swallow Premature pharyngeal entry   Pharyngeal Phase of Swallow impaired;coughing/choking   Diagnostic Statement Coughed x1 after a swallow via the straw.    Clinical Swallow Evaluation: Puree Solid Texture Trial   Mode of Presentation, Puree spoon;self-fed   Volume of Puree Presented 2 oz of apple sauce   Oral Phase, Puree WFL   Pharyngeal Phase, Puree intact   Diagnostic Statement No Sx of aspiration.   Clinical Swallow Evaluation: Solid Food Texture Trial   Mode of Presentation self-fed   Volume Presented marisa cracker and lunch.   Oral Phase residue in oral cavity   Pharyngeal Phase impaired;repeated swallows   Diagnostic Statement No immediate or delayed Sx of aspiration.    Swallowing Recommendations   Diet Consistency Recommendations regular diet;thin liquids (level 0)   Supervision Level for Intake distant supervision needed   Mode of Delivery Recommendations bolus size, small;no straws;slow rate of intake   Postural Recommendations none   Swallowing Maneuver Recommendations alternate food and liquid intake;extra swallow   Monitoring/Assistance Required (Eating/Swallowing) monitor for cough or change in vocal quality with intake   Recommended Feeding/Eating Techniques (Swallow Eval) maintain upright sitting position for eating;maintain upright posture during/after eating for 30 minutes   Medication Administration Recommendations, Swallowing (SLP) Whole as tolerated.    Instrumental Assessment Recommendations VFSS (videofluroscopic swallowing study)  (Rule out silent aspiration.)   Comment, Swallowing Recommendations Patient does not feel it's necessary.    General Therapy Interventions   Planned Therapy Interventions Dysphagia Treatment    Dysphagia treatment Instruction of safe swallow strategies   SLP Therapy Assessment/Plan   Criteria for Skilled Therapeutic Interventions Met (SLP Eval) yes   SLP Diagnosis Minimal dysphagia   Rehab Potential (SLP Eval) good, to achieve stated therapy goals   Therapy Frequency (SLP Eval) one time eval and treatment only   Comment, Therapy Assessment/Plan (SLP) Patient presents with minimal oral and pharyngeal dysphagia due to generalized weakness. Patient stated he does not have problems swallowing. He has a congested cough at baseline. Oral motor function is grossly intact and has strong cough on command for airway protection. He demonstrated a fairly timely swallow response with thin liquids via the cup and no immediate Sx of aspiration via the cup. Did have a cough x1 via the straw. Pureed textures were WFL. Mastication was sufficient for solids with good bolus extaction and clearing. Slight vocal change noted x1 during the meal that was cleared with a liquid rinse. Recommend: 1. May continue on a regular diet and thin liquids. 2. Up in a chair for all meals, avoid straws, single small sips/bites and alternate liquids/solids as needed.    Therapy Plan Review/Discharge Plan (SLP)   Therapy Plan Review (SLP) evaluation/treatment results reviewed;care plan/treatment goals reviewed;risks/benefits reviewed;current/potential barriers reviewed;participants voiced agreement with care plan;participants included;patient   SLP Discharge Planning    SLP Discharge Recommendation (DC Rec) home   SLP Rationale for DC Rec No further skilled ST needs at discharge.    SLP Brief overview of current status  Patient with minimal oral and pharyngeal dysphagia. May continue on a regular diet and thin liquids.     Total Evaluation Time   Total Evaluation Time (Minutes) 17

## 2021-10-27 NOTE — CONSULTS
Care Management Initial Consult    General Information  Assessment completed with: Xavier Villalba  Type of CM/SW Visit: Initial Assessment    Primary Care Provider verified and updated as needed: Yes   Readmission within the last 30 days: no previous admission in last 30 days         Advance Care Planning: Advance Care Planning Reviewed: questions answered          Communication Assessment  Patient's communication style: spoken language (English or Bilingual)    Hearing Difficulty or Deaf: yes   Wear Glasses or Blind: no    Cognitive  Cognitive/Neuro/Behavioral: WDL                      Living Environment:   People in home: alone (wife in hospital and tranfer to memory Care)     Current living Arrangements: house      Able to return to prior arrangements: yes       Family/Social Support:  Care provided by: self  Provides care for: no one  Marital Status:  (wife might be in memory care?)  Sibling(s)          Description of Support System: Supportive         Current Resources:   Patient receiving home care services: No     Community Resources: None  Equipment currently used at home: none  Supplies currently used at home: None    Employment/Financial:  Employment Status: retired        Financial Concerns: No concerns identified           Lifestyle & Psychosocial Needs:  Social Determinants of Health     Tobacco Use: Medium Risk     Smoking Tobacco Use: Former Smoker     Smokeless Tobacco Use: Unknown   Alcohol Use:      Frequency of Alcohol Consumption:      Average Number of Drinks:      Frequency of Binge Drinking:    Financial Resource Strain:      Difficulty of Paying Living Expenses:    Food Insecurity:      Worried About Running Out of Food in the Last Year:      Ran Out of Food in the Last Year:    Transportation Needs:      Lack of Transportation (Medical):      Lack of Transportation (Non-Medical):    Physical Activity:      Days of Exercise per Week:      Minutes of Exercise per Session:    Stress:   "    Feeling of Stress :    Social Connections:      Frequency of Communication with Friends and Family:      Frequency of Social Gatherings with Friends and Family:      Attends Restorationist Services:      Active Member of Clubs or Organizations:      Attends Club or Organization Meetings:      Marital Status:    Intimate Partner Violence:      Fear of Current or Ex-Partner:      Emotionally Abused:      Physically Abused:      Sexually Abused:    Depression:      PHQ-2 Score:    Housing Stability:      Unable to Pay for Housing in the Last Year:      Number of Places Lived in the Last Year:      Unstable Housing in the Last Year:        Functional Status:  Prior to admission patient needed assistance:   Dependent ADLs:: Independent  Dependent IADLs:: Independent       Mental Health Status:  Mental Health Status: Other (see comment) (appears depressed, head down, missing dog)       Chemical Dependency Status:      unknown          Values/Beliefs:  Spiritual, Cultural Beliefs, Restorationist Practices, Values that affect care: no               Additional Information:  Met with patient who had head down on side table. Patient did not mention spouse only mentioned cleaning house now. Per bedside RN, patient's spouse is in the hospital and might need placement at memory care. Patient stated, \"I just want to go home.\"    Krystal Fishman RN        "

## 2021-10-27 NOTE — PLAN OF CARE
A&O x4, Jamul. Pt denied pain. VSS, RA. Up IND. Tele: SR BBB 1st degree AVB.  Ambulated in [place] multiple times. K+replaced x2 recheck in the am. Plan for possible PO diuretics tomorrow pending LFT's and Creatinine. Continue to monitor.

## 2021-10-27 NOTE — PROGRESS NOTES
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    Elevated liver transaminases  Normal LFTs 10/24/21 , ALT 83, AST 57. 10/25 , , . Spiked yesterday , ALT 8-1, .Trending down today ,    Had acute cardiac event with paroxysmal ventricular tachycardia patient has history of chronic congestive heart failure with acute decompensation and significant fluid overload patient is currently being diuresed. Started on amiodarone.  differential for patient's elevated acute hepatitis is most likely drug-induced combination of amiodarone along with statins other differential diagnosis would include ischemic liver injury due to recent cardiac event congestive heart failure and arrhythmia.  Viral hepatitis workup negative Hep B,C and autoimmune markers negative.    -- Continue to trend LFTs daily  -- Hold Crestor  -- GI will continue to follow along             Interval History:   no new complaints, doing well and doing well; no cp, sob, n/v/d, or abd pain.              Review of Systems:   C: NEGATIVE for fever, chills, change in weight  E/M: NEGATIVE for ear, mouth and throat problems  R: NEGATIVE for significant cough or SOB  CV: NEGATIVE for chest pain, palpitations or peripheral edema             Medications:   I have reviewed  this patient's current medications    amiodarone  200 mg Oral Daily     aspirin  325 mg Oral Once    Or     aspirin  243 mg Oral Once     aspirin  81 mg Oral Daily     clopidogrel  75 mg Oral Daily     isosorbide mononitrate  30 mg Oral At Bedtime     metoprolol succinate ER  50 mg Oral BID     pantoprazole  40 mg Oral BID     [Held by provider] rosuvastatin  40 mg Oral At Bedtime     sodium chloride (PF)  3 mL Intracatheter Q8H                  Physical Exam:   Vitals were reviewed  Vital Signs with Ranges  Temp:  [97.5  F (36.4  C)-99.1  F (37.3  C)] 97.5  F (36.4  C)  Pulse:  [55-59] 59  Resp:  [16-18] 18  BP: (106-117)/(51-60) 117/59  SpO2:  [91 %-97 %] 93 %  I/O last 3 completed shifts:  In: 1160 [P.O.:1160]  Out: 2000 [Urine:2000]  Constitutional: healthy, alert and no distress   Cardiovascular: negative, PMI normal. No lifts, heaves, or thrills. RRR. No murmurs, clicks gallops or rub  Respiratory: negative, Percussion normal. Good diaphragmatic excursion. Lungs clear  Abdomen: Abdomen soft, non-tender. BS normal. No masses, organomegaly           Data:   I reviewed the patient's new clinical lab test results.   Recent Labs   Lab Test 10/27/21  0559 10/26/21  0554 10/25/21  0244 10/24/21  1038   WBC 9.5 9.4  --  9.5   HGB 8.1* 8.1*  --  8.5*   MCV 91 91  --  94    219  --  272   INR  --   --  1.56* 1.32*     Recent Labs   Lab Test 10/27/21  0559 10/26/21  1116 10/26/21  0554 10/25/21  0244 10/25/21  0244   POTASSIUM 3.0* 3.5 3.2*   < > 3.9   CHLORIDE 96  --  97  --  102   CO2 26  --  24  --  19*   BUN 70*  --  61*  --  49*   ANIONGAP 9  --  12  --  11    < > = values in this interval not displayed.     Recent Labs   Lab Test 10/27/21  0559 10/26/21  0554 10/25/21  0244   ALBUMIN 2.9* 3.0* 2.9*   BILITOTAL 1.0 0.9 1.0   * 801* 606*   * 764* 813*       I reviewed the patient's new imaging results.    All laboratory data reviewed  All imaging studies reviewed by me.    Lata Arvizu PA-C,   10/27/2021  [facility]  Office : [phone]  Cell: [phone] (Dr. Joe)  Cell: [phone] (Lata Arvizu PA-C)

## 2021-10-27 NOTE — PROGRESS NOTES
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       Assessment & Plan       Paroxysmal ventricular tachycardia  He was started on IV amiodarone drip and now switch to oral amiodarone, dose decrease  because of elevated  LFT's  Evaluated by Cardiology and EP, no ICD as patient is DNR/DNI.  Again has episode of VT 10/27, his potassium lower again, replace it today, magnesium normal.   Keep on electrolytes replacement protocol. Continue lower dose of Amiodarone as per EP as no other good alternative. Start on K-dur 20 meq daily to avoid hypokalemia from tomorrow, replace electrolytes today.       NSTEMI  Acute on chronic diastolic congestive heart failure exacerbation (HFpEF)  Coronary artery disease, multivessel, with history of CABG (LIMA to LAD, SVG to 1st diagonal, SVG to OM1) s/p thrombectomy and angioplasty SVG to OM1 10/11/21, ART to SVG to OM1 10/14/21  Hypertension  Dyslipidemia   *Presents with progressive lower extremity edema, orthopnea, dyspnea on exertion.   *CXR with mild interstitial prominence suggestive of CHF, clinically consistent with symptoms  *Recently admitted 10/11-10/16/21 at [facility], with angiogram x2 and stent placement as above  *Diuresed for HFpEF during that admission, though diuretics held due to creatinine rise (though occurring following angiogram, so possibly BREANNE component). Eventually started on furosemide 20 mg daily-> increased to 40 mg BID by PCP as outpatient 10/22.   *Most  recent EF 53% on echocardiogram 10/11/21  *Troponin 0.228, similar elevation seen during recent admission, though with report of chest pressure and ventricular arrhythmias new episode of ACS possible     He was started on IV lasix 80 mg TID on admission, now switch to IV Bumex infusion   - Cardiology consulted  - Troponin trending up, but creatinine increase to 3.13, will need coronary angiogram once creatinine improve.   - Repeat echocardiogram shows EF of 40-45%, inferolateral wall akinesis. Moderate MR   - Telemetry  - Continue prior to admission aspirin, clopidogrel, rosuvastatin, metoprolol XL, defer heparin to Cardiology   He has lost about 4 Ibs since admission, good urine out put about 3 liter in last 24 hrs, Bumex infusion hold by the Nephrology as this time LE edema improve, but still fluid overloaded at this time.      Hyponatremia  Sodium 132. 133 at time of discharge 10/18.  Clinically hypervolemic.  - Hyponatremia likely secondary to CHF and poor prognostic factor, improve with diuresis today      Hypokalemia  On admission K 3.3, lower again, like to keep it about 4,  Start him on low dose oral K-dur 20 meq daily after replacement today   - Monitor and replace per protocol      Acute Hepatitis likely secondary to Amiodarone with element of CHF/ischemic injury as well.   AST and ALT elevation, was AST 57, ALT 83. On admission now increase to 813 and 606, continue to worse  Now ALT 10X its baseline and AST >10x its baseline, I think this happen after IV amiodarone and possible ischemic injury.  Oral Amiodarone dose now decrease, Tranasminases are trending down now, continue to monitor.     Total bilirubin normal, INR mildly elevated at 1.56.  Concern for congestive hepatopathy in the setting of CHF but I think this is more likely secondary to amiodarone.    US RUQ done today shows mild non specific gallbladder wall thickening, no gallstone, normal liver and no biliary ductal dilatation, will follow his  LFT's    hepatitis b, hepatitis c, ferritin, ceruloplasmin, SCOTT, ASMA, AMA send and pending at this time.     Amiodarone dose decrease by EP today, LFT trending down, continue lower dose of amiodarone as no other good alternative for his VT.        Acute kidney injury on chronic kidney disease, stage 3  Baseline creatinine 1.4-1.6 with GFR in 40s. Creatinine 2.04 on admission. Mild SHAYE with peak of 1.72 10/13/21 during recent admission, possibly due to diuresis though timing consistent with BREANNE from angiogram as well.  Clinically hypervolemic, thus concerning for cardiorenal physiology, creatinine up to 3.39 with diuresis.   Nephrology consulted  - Avoid nephrotoxins  - Daily BMP with diuresis as above, on IV Bumex infusion of 0.5 mg/hr  But creatinine worsening, that was expected with IV diuresis, Nephrology holding IV Infusion at this time, LE cramps improve.      Recent acute blood loss anemia  Hemoglobin 8.5 g/dl, stable from recent discharge of 8.7 g/dl.  Was seen by gastroenterology during recent admission however no EGD was recommended and cardiology had attributed his drop in hemoglobin to blood loss from access site following first angiogram.  - Monitor hemoglobin closely while on heparin drip  - CBC in the AM           Diet:   2 gram sodium restricted diet   DVT Prophylaxis: Heparin drip discontinue by cardiology   Ignacio Catheter: Not present       Discussed with patient, Dr Dai and the nursing staff taking care of the patient today.     DVT Prophylaxis: Pneumatic Compression Devices  Code Status: No CPR- Do NOT Intubate    Disposition: Expected discharge in 2-3 days once CHF and renal failure  improves.     Floyd Mckeon MD  Text Page  (7am - 6pm)    Interval History   Patient had an episode of VT this morning but remain asymptomatic, at time of my visit feeling much better today as his leg pain and cramps now improve, no chest pain, SOB, fever, chills, nausea, vomiting, headache or dizziness at this  time.     No other significant event overnight.      -Data reviewed today: I reviewed all new labs and imaging results over the last 24 hours. I personally reviewed no images or EKG's today.    Physical Exam   Temp: 97.5  F (36.4  C) Temp src: Oral BP: 117/59 Pulse: 59   Resp: 18 SpO2: 93 % O2 Device: None (Room air)    Vitals:    10/25/21 0148 10/26/21 0506 10/27/21 0332   Weight: 72.4 kg (159 lb 9.6 oz) 70.5 kg (155 lb 8 oz) 68.6 kg (151 lb 4.8 oz)     Vital Signs with Ranges  Temp:  [97.5  F (36.4  C)-99.1  F (37.3  C)] 97.5  F (36.4  C)  Pulse:  [55-59] 59  Resp:  [16-18] 18  BP: (106-117)/(51-60) 117/59  SpO2:  [91 %-97 %] 93 %  I/O last 3 completed shifts:  In: 1160 [P.O.:1160]  Out: 2000 [Urine:2000]    Constitutional: awake, alert, cooperative, no apparent distress, and appears stated age  Eyes: Lids and lashes normal, pupils equal, round and reactive to light, extra ocular muscles intact, sclera clear, conjunctiva normal  Respiratory: No increased work of breathing, good air exchange, basal Crackles positive, no wheezing  Cardiovascular: Normal apical impulse, regular rate and rhythm, normal S1 and S2, no S3 or S4, and no murmur noted, JVD  elevated.   GI: No scars, normal bowel sounds, soft, non-distended, non-tender, no masses palpated, no hepatosplenomegally  Skin: no bruising or bleeding  Musculoskeletal: 2+ lower extremity pitting edema present  Neurologic: no focal deficit.     Medications     - MEDICATION INSTRUCTIONS -       - MEDICATION INSTRUCTIONS -       - MEDICATION INSTRUCTIONS -       ACE/ARB/ARNI NOT PRESCRIBED       sodium chloride         amiodarone  200 mg Oral Daily     aspirin  325 mg Oral Once    Or     aspirin  243 mg Oral Once     aspirin  81 mg Oral Daily     clopidogrel  75 mg Oral Daily     isosorbide mononitrate  30 mg Oral At Bedtime     metoprolol succinate ER  50 mg Oral BID     pantoprazole  40 mg Oral BID     potassium chloride  20 mEq Oral Once     [Held by provider]  rosuvastatin  40 mg Oral At Bedtime     sodium chloride (PF)  3 mL Intracatheter Q8H       Data   Recent Labs   Lab 10/27/21  1201 10/27/21  0559 10/26/21  1116 10/26/21  0554 10/26/21  0554 10/25/21  1013 10/25/21  0244 10/25/21  0244 10/24/21  2007 10/24/21  1601 10/24/21  1038 10/24/21  1038   WBC  --  9.5  --   --  9.4  --   --   --   --   --   --  9.5   HGB  --  8.1*  --   --  8.1*  --   --   --   --   --   --  8.5*   MCV  --  91  --   --  91  --   --   --   --   --   --  94   PLT  --  185  --   --  219  --   --   --   --   --   --  272   INR  --   --   --   --   --   --   --  1.56*  --   --   --  1.32*   NA  --  131*  --   --  133  --   --  132*  --   --    < > 132*   POTASSIUM 3.3* 3.0* 3.5   < > 3.2*  --    < > 3.9  --  4.8   < > 3.3*   CHLORIDE  --  96  --   --  97  --   --  102  --   --    < > 101   CO2  --  26  --   --  24  --   --  19*  --   --    < > 21   BUN  --  70*  --   --  61*  --   --  49*  --   --    < > 43*   CR  --  3.39*  --   --  3.13*  --   --  2.38*  --   --    < > 2.04*   ANIONGAP  --  9  --   --  12  --   --  11  --   --    < > 10   MATTHEW  --  8.4*  --   --  8.5  --   --  8.1*  --   --    < > 8.4*   GLC  --  97  --   --  98  --   --  105*  --   --    < > 122*   ALBUMIN  --  2.9*  --   --  3.0*  --    < > 2.9*  --   --    < > 3.2*   PROTTOTAL  --  6.7*  --   --  6.6*  --    < > 6.5*  --   --    < > 7.3   BILITOTAL  --  1.0  --   --  0.9  --    < > 1.0  --   --    < > 0.9   ALKPHOS  --  199*  --   --  211*  --    < > 149  --   --    < > 135   ALT  --  703*  --   --  801*  --    < > 606*  --   --    < > 83*   AST  --  419*  --   --  764*  --    < > 813*  --   --    < > 57*   TROPONIN  --   --   --   --   --  2.983*  --   --  3.460* 1.931*   < > 0.228*    < > = values in this interval not displayed.       Recent Results (from the past 24 hour(s))   XR Chest 2 Views    Narrative    EXAM: XR CHEST 2 VW  LOCATION: [facility]  DATE/TIME: 10/26/2021 5:34 PM    INDICATION:  CHF.  COMPARISON: 10/24/2021.      Impression    IMPRESSION: Increased bilateral basilar atelectasis and/or infiltrate. New small left effusion. Mild vascular congestion.

## 2021-10-27 NOTE — PROGRESS NOTES
[facility]  EP Cardiology Progress Note  Date of Service: 10/27/2021      Xavier Giordano is a 76 year old male with past medical history significant for CAD s/p CABG (2005), stent in VG (2017) and recent coronary stent placement and aspiration thrombectomy and angioplasty of VG to 1st obtuse marginal (10/2021), PAD with ischemic toe (cholesterol embolism at time of PCI) s/p 5th left toe amputation, rheumatic fever,previous tobacco abuse, and Agent Orange exposure, LE edema admitted on 10/24/2021 with chest pain with no relief from nitroglycerin, lower extremity edema, orthopnea, dyspnea on exertion.   At arrival to the ED patient's ECG showed wide complex tachycardia.  His troponin was at 0.228 admission and trended up to 3.4, potassium 3.3, creatinine 2.04, hgb 8.5, ProBNP was 19,795 at admission      Interval History  Had a 50 beat run of wide complex tachycardia overnight  Potassium noted to be 3.0 this morning.    Blood pressure controlled.    Heart rate 52-65 bpm.   Pt is feeling better today and denies chest pain, shortness of breath.     Assessment  Wide complex tachycardia    At OSH on 10/11/2021 admission had salvos of PVCs/NSVT quieted post PCI, but returned AM of 10/12/2021; subsequently PVC salvos resolved after initiation of metoprolol    Amiodarone infusion was started decreased to amiodarone 200 mg daily due to elevated liver enzymes.  At this point, amiodarone is the best choice as sotalol is not appropriate when patient is in heart failure.      Currently taking metoprolol XL 50 mg BID     Pt is DNR/DNI.         Coronary artery disease     PTA aspirin, Plavix 75 mg daily, Imdur 60 mg daily, metoprolol XL 50 mg BID, rosuvastatin 40 mg daily    CABG in 2005 with LIMA to LAD, VG to the D1, VG to OM1     PCI to OM/SVG anastomosis 2017 in setting of NSTEMI    S/p PCI to VG of OM with ART (10/2021) at [facility];     Appreciate general cardiology managing.     Angiogram on hold due  to increasing creatinine     HFpEF    ECHO (10/2021) showed EF 40-45% with inferior wall akinesis, moderate (2+) mitral regurgitation .    Appreciate general cardiology managing.    CKD    Creatinine today 3.39 and baseline 1.4     Appreciate nephrology managing.     Elevated liver enzymes    Normal at admission    Started on amiodarone infusion which has been decreased to oral 200 mg daily     ALT Elevated to 801 on 10/26/21      Plan  1. Continue amiodarone 200 mg daily. Aware of GI recommendation however cannot use sotalol in setting of Heart failure.   Will expect breakthrough while fully loading amiodarone.            Yamilka Horne, APRN CNP  [facility]  Text Page  (M-F 7:30 am - 4:00 pm)      Physical Exam   Temp: 97.5  F (36.4  C) Temp src: Oral BP: 117/59 Pulse: 59   Resp: 18 SpO2: 93 % O2 Device: None (Room air)    Vitals:    10/25/21 0148 10/26/21 0506 10/27/21 0332   Weight: 72.4 kg (159 lb 9.6 oz) 70.5 kg (155 lb 8 oz) 68.6 kg (151 lb 4.8 oz)       GEN:  In general, this is a well nourished male in no acute distress.  HEENT:  Pupils normal size, equal, and round. Sclerae nonicteric. Clear oropharynx. Mucous membranes moist,  no cyanosis.  NECK: Supple, no asymmetry, masses, or scars appreciated. Trachea midline.   C/V:  Regular rate and rhythm, no murmur, rub or gallop. No S3 or RV heave.   RESP: Respirations are unlabored. No use of accessory muscles. Clear to auscultation bilaterally without wheezing, rales, or rhonchi.  GI: Abdomen soft, nontender, nondistended. No hepatosplenomegaly appreciated. No masses palpable, bowel sounds normal.  EXTREMITY: no LE edema. No cyanosis or clubbing.  MS: Large joints without obvious swelling or erythema.   VASC: Radial and dorsalis pedis are normal in volumes and symmetric bilaterally.   NEURO: Alert and oriented, cooperative. No obvious focal deficits.   PSYCH: Normal affect.  SKIN: Warm and dry.     Medications     - MEDICATION INSTRUCTIONS -       -  MEDICATION INSTRUCTIONS -       - MEDICATION INSTRUCTIONS -       ACE/ARB/ARNI NOT PRESCRIBED       sodium chloride         amiodarone  200 mg Oral Daily     aspirin  325 mg Oral Once    Or     aspirin  243 mg Oral Once     aspirin  81 mg Oral Daily     clopidogrel  75 mg Oral Daily     isosorbide mononitrate  30 mg Oral At Bedtime     metoprolol succinate ER  50 mg Oral BID     pantoprazole  40 mg Oral BID     [Held by provider] rosuvastatin  40 mg Oral At Bedtime     sodium chloride (PF)  3 mL Intracatheter Q8H       Data   Most Recent 3 CBC's:Recent Labs   Lab Test 10/27/21  0559 10/26/21  0554 10/24/21  1038   WBC 9.5 9.4 9.5   HGB 8.1* 8.1* 8.5*   MCV 91 91 94    219 272     Most Recent 3 BMP's:Recent Labs   Lab Test 10/27/21  0559 10/26/21  1116 10/26/21  0554 10/25/21  0244 10/25/21  0244   *  --  133  --  132*   POTASSIUM 3.0* 3.5 3.2*   < > 3.9   CHLORIDE 96  --  97  --  102   CO2 26  --  24  --  19*   BUN 70*  --  61*  --  49*   CR 3.39*  --  3.13*  --  2.38*   ANIONGAP 9  --  12  --  11   MATTHEW 8.4*  --  8.5  --  8.1*   GLC 97  --  98  --  105*    < > = values in this interval not displayed.     Most Recent 2 LFT's:Recent Labs   Lab Test 10/27/21  0559 10/26/21  0554   * 764*   * 801*   ALKPHOS 199* 211*   BILITOTAL 1.0 0.9     Most Recent 3 Troponin's:Recent Labs   Lab Test 10/25/21  1013 10/24/21  2007 10/24/21  1601   TROPONIN 2.983* 3.460* 1.931*     Most Recent 3 BNP's:Recent Labs   Lab Test 10/24/21  1038   NTBNPI 19,795*     Most Recent TSH and T4:Recent Labs   Lab Test 10/24/21  1038   TSH 1.66

## 2021-10-27 NOTE — PROGRESS NOTES
[facility]    Cardiology Progress Note    Date of Service: 10/27/2021     Assessment & Plan   Xavier Giordano is a 76 year old male with past medical history of CAD, CABG in 2005, CKD recent PCI at [facility], melena with hemoglobin drop recently at [facility], hyponatremia on that admission and Hydrochlorothiazide stopped, HTN, PAD (AAA), dyslipidemia, NSVT.  This patient was admitted on 10/24/2021 with symptoms of VINCENT, palpitations, and chest pressure.  DNR/DNI status    1. Nonstemi  HX CABG 2005(LIMA to LAD, SVG to first diagonal, SVG to OM1    Admitted to ED here 10-11 for laceration and left AMA as needed to take dog to vet and find care for his wife, then went to [facility] where he underwent PCI  -angiography X2     First demonstrated known  of the LAD and the right coronary artery.    - new 90% lesion of the proximal circumflex compared compared with the angiogram done in 2017.    -saphenous vein graft to the obtuse marginal branch was occluded with intraluminal thrombus.    - saphenous vein graft to the diagonal branch had a 90% degenerated lesion in the middle segment, but had REINA 3 flow.    -The LIMA to LAD was patent, supplying the LAD that was also filling the PDA via mainly septal collaterals.   Had SVG OM-2 thrombectomey with 48 hours of Eptfibatide then returned for-->  -PCI to SVG of  with ART (Xience Isamar rx 3.5 x 33mm), resolute [place] 3.5mm x 38mm, Xience 3.25mm x 38mm Xience and Xience 3.25 X 38mm  -staged diagonal PCI planned at [facility] to [place]  He also suffered a right groin ooze; left groin is clean; no hum or bruit on either side  Troponin peak 3.4    PLAN:  He states his chest pain on admission was more related to his tachycardia and not his previous chest pain PCI  HOLD ON ANGIOGRAM due to SHAYE  Continue DAPT nitrates and betablockers      2. CKD with worsening creatinine   Creatinine at [facility] appeared to  have baseline of 1.4-1.5  Admitted here at 2.04-->2.38--> 3.13-->3.39  Uncertain what his dye load was at [facility]  May be cardiorenal   4 days post contrast load his creatinine at his PMD was 1.4  Appreciate Nephrology consult  bumex drip 0.5mg stopped yesterday  Had one dose IV diuril 0-25      3. HFrEF and diastolic dysfunction    BNP 19,795  Echo at [facility] on 10-11 showed EF 53% with WMA in inferior and posterior walls  Aortic sclerosis with mild to moderate AI, moderate MR and moderate TR  Echo-->EF 40-45% in inferolateral HK; moderate LVH with grade II diastolic dysfunction and moderate MR, mild AI  CXR  with mild interstitial prominence  Weight down 8 pounds  I/O net - 3.4 liters  K 3.0 -->replacing  Less edema; JVP still up and crackles 1/3 up and scrotal edema improved by 50%      4. Anemia with two days of Melena at [facility]  GI consult reportedly done at [facility] who did not recommend EGD  And thought hemoglobin drop in part was post instrumentation to right groin  mcv normal; platelets normal  Previous hemoglobins 11-12  8.1 today with elevated ferritin to 1561 MCV 91    4. Wide complex tachycardia--NSVT today with potassium of 3.0  He was asymptomatic  EP following  Cannot use sotalol with HF   Amiodarone restarted per EP  No ICD due to DNR/DNI status    5 acute elevation of Liver enzymes and elevated INR  ast 57--> 813-->765-->419  Alt 83-->606-->801-->703  Crestor on hold  Ultrasound shows normal liver    May be some component of passive congestion but RV function read as normal on echo at [facility]  GI consult noted      6. Hypertension  Hydrochlorothiazide stopped recently due to low sodium  Sodium 133, up from 126 at [facility]    7. Dyslipidemia  Crestor on hold currently due to liver enzyme elevation   in March    8. Social issues  He cares for his wife with dementia who is currently hospitalized and his sister is assiting    Amy Stubbs, MSN, APRN, CNP  [facility]      Interval History   Less  SOB  Edema improving        Review of Systems:  The Review of Systems is negative other than noted in the HPI    Physical Exam   Temp: 97.5  F (36.4  C) Temp src: Oral BP: 117/59 Pulse: 59   Resp: 18 SpO2: 93 % O2 Device: None (Room air)    Vitals:    10/25/21 0148 10/26/21 0506 10/27/21 0332   Weight: 72.4 kg (159 lb 9.6 oz) 70.5 kg (155 lb 8 oz) 68.6 kg (151 lb 4.8 oz)     Vital Signs with Ranges  Temp:  [97.5  F (36.4  C)-99.1  F (37.3  C)] 97.5  F (36.4  C)  Pulse:  [55-69] 59  Resp:  [16-18] 18  BP: (106-119)/(51-60) 117/59  SpO2:  [91 %-97 %] 93 %  I/O last 3 completed shifts:  In: 1160 [P.O.:1160]  Out: 2000 [Urine:2000]    Constitutional     alert and oriented, in no acute distress.     Skin     warm and dry to touch    ENT     ++pallor or cyanosis    Neck    Supple, JVP elevated no carotid bruit    Chest     no tenderness to palpation     Lungs  Crackles 1/3 up    Cardiac  regular rhythm, S1 normal, S2 normal, No S3 or S4, 2 separate systolic murmurs RUSB and apex to axilla    Abdomen     abdomen soft, bowel sounds normoactive, no hepatosplenomegaly    Extremities and Back     no clubbing, cyanosis. 1-2+ pitting foot edema ; 1+ pretibial edema  Scrotal swelling      Neurological     no gross motor deficits noted, affect appropriate, oriented to time, person and place.        Medications     - MEDICATION INSTRUCTIONS -       - MEDICATION INSTRUCTIONS -       - MEDICATION INSTRUCTIONS -       ACE/ARB/ARNI NOT PRESCRIBED       sodium chloride         amiodarone  200 mg Oral Daily     aspirin  325 mg Oral Once    Or     aspirin  243 mg Oral Once     aspirin  81 mg Oral Daily     clopidogrel  75 mg Oral Daily     isosorbide mononitrate  30 mg Oral At Bedtime     metoprolol succinate ER  50 mg Oral BID     pantoprazole  40 mg Oral BID     [Held by provider] rosuvastatin  40 mg Oral At Bedtime     sodium chloride (PF)  3 mL Intracatheter Q8H          Data:     ROUTINE IP LABS (Last four results)  BMP  Recent Labs    Lab 10/27/21  0559 10/26/21  1116 10/26/21  0554 10/25/21  0244 10/24/21  1601 10/24/21  1038   *  --  133 132*  --  132*   POTASSIUM 3.0* 3.5 3.2* 3.9   < > 3.3*   CHLORIDE 96  --  97 102  --  101   MATTHEW 8.4*  --  8.5 8.1*  --  8.4*   CO2 26  --  24 19*  --  21   BUN 70*  --  61* 49*  --  43*   CR 3.39*  --  3.13* 2.38*  --  2.04*   GLC 97  --  98 105*  --  122*    < > = values in this interval not displayed.     CHOLESTEROL/HEPATIC  Recent Labs   Lab 10/27/21  0559 10/26/21  0554 10/25/21  0244 10/24/21  1038   * 801* 606* 83*   * 764* 813* 57*     CBC  Recent Labs   Lab 10/27/21  0559 10/26/21  0554 10/26/21  0554 10/24/21  1038 10/24/21  1038   WBC 9.5  --  9.4  --  9.5   RBC 2.66*  --  2.65*  --  2.78*   HGB 8.1*   < > 8.1*   < > 8.5*   HCT 24.2*  --  24.0*  --  26.0*   MCV 91  --  91  --  94   MCH 30.5  --  30.6  --  30.6   MCHC 33.5  --  33.8  --  32.7   RDW 15.4*  --  15.4*  --  15.3*     --  219  --  272    < > = values in this interval not displayed.     TROP: No lab results found in last 7 days.   BNP:    Recent Labs   Lab 10/24/21  1038   NTBNPI 19,795*     INR  Recent Labs   Lab 10/25/21  0244 10/24/21  1038   INR 1.56* 1.32*     TSH   Date Value Ref Range Status   10/24/2021 1.66 0.40 - 4.00 mU/L Final       EKG results:  Reviewed if available     Imaging:  Recent Results (from the past 24 hour(s))   XR Chest 2 Views    Narrative    EXAM: XR CHEST 2 VW  LOCATION: [facility]  DATE/TIME: 10/26/2021 5:34 PM    INDICATION: CHF.  COMPARISON: 10/24/2021.      Impression    IMPRESSION: Increased bilateral basilar atelectasis and/or infiltrate. New small left effusion. Mild vascular congestion.     Telemetry:  Sinus rhythm

## 2021-10-27 NOTE — PROGRESS NOTES
Reviewed with salo Prather seen and examined.  Aware of 1 episode of nonsustained VT, relatively slow.  Sinus rate is about 60 bpm.  He states feeling better.    AST/ALT remains elevated but seems to be trending down. Not able to stop amiodarone because of lack of an alternative. Sotalol is a poor choice of ongoing CHF and renal dysfunction. Amiodarone is reduced to 200 mg daily. Will accept occasional nonsustained VT before amiodarone reaches full effect. Continue to monitor AST/ALT.  Low potassium. Agree for replacement.  Creatinine is up to 3.39. He has lost about 8 lb since the admission. Agree to hold off iv Bumex.     76 year-old white male, presented to the ER with CP and SOB. Found to be in wide QRS tachycardia. ECG tracings reviewed, consistent with LV VT.  Known CAD with CABG 15 years ago and stenting a few years ago. Not following cardiology regularly.  Current EF is reported 40%.  Troponin elevation up to 3.46. No chest pain in sinus rhythm now.  PMH: AAA, hypertension, SIADH, PAD.  DNR/DNI.     Recommend:  Continue po amiodarone.   No ICD because of DNR/DNI code status.

## 2021-10-27 NOTE — PROGRESS NOTES
Nephrology Progress Note  10/27/2021         Assessment & Recommendations:   Xavier Giordano is a 76 year old male with past medical history of coronary artery disease status post CABG in 2005, hypertension, hyperlipidemia, congestive heart failure -admitted on 10/24 with chest pain, shortness of breath, wide-complex tachycardia     1 CKD 3-baseline creatinine around 1.3-1.4.  Creatinine was down to 1.4 on 10/18 after recent PCI.  Now admitted with creatinine of 2 and up to 3.3 with diuresis   -Creatinine bump in setting of wide-complex tachycardia, unstable hemodynamics, diuresis. Contrast use.  Nonoliguric  -Recent UA without any proteinuria or hematuria     2 coronary artery disease/wide-complex tachycardia-recent PCI earlier this month to SVG.  Cardiology holding off angiogram given resolution of chest pain as well as rising creatinine.  -On oral amiodarone  -Troponin trending down  -Noted no plans for ICD given DNR/DNI status     3 heart failure with preserved ejection fraction - Clinically , he is clearly in CHF and has significant improvement in symptoms with diuresis.  Creatinine however has trended up with brisk diuresis.    -We will hold Bumex for today.  Plan to switch to oral diuretics tomorrow.     4 raise LFT-transaminases.  Possibly related to heart failure.  Monitor.  Amiodarone dose decreased. Statin on hold     D/w Cardiology team      Monae Fletcher MD  [facility] - Nephrology   [phone]      Interval History :   Seen / examined.   Reports feeling much better.  Off oxygen.  Leg swelling and scrotal swelling is significantly lower.  Bumex drip stopped yesterday morning. Had 2 L UOP with ~850 ml neg balance and weight down by another ~ 2 kg   With that , Cr is slightly higher       Review of Systems:   A 4 point review of systems was negative except as noted above.  Notably: fair appetite. no nausea or vomiting or diarrhea.  no confusion,  no fever or chills    Physical Exam:   I/O last  3 completed shifts:  In: 1160 [P.O.:1160]  Out: 2000 [Urine:2000]    GENERAL APPEARANCE: no distress,  awake  Endo: no moon facies, no goiter  Pulmonary: crackles b/l base  CV: regular rhythm, normal rate, no rub   - JVP +   - Edema +, pitting.  Scrotal edema plus  GI: soft, nontender,   MS: no evidence of inflammation in joints  SKIN: no rash, warm, dry, no cyanosis  NEURO: face symmetric, no asterixis        Labs:   All labs reviewed by me  Electrolytes/Renal -   Recent Labs   Lab Test 10/27/21  1201 10/27/21  0559 10/26/21  1116 10/26/21  0554 10/26/21  0554 10/25/21  1013 10/25/21  0244 10/25/21  0244   NA  --  131*  --   --  133  --   --  132*   POTASSIUM 3.3* 3.0* 3.5   < > 3.2*  --    < > 3.9   CHLORIDE  --  96  --   --  97  --   --  102   CO2  --  26  --   --  24  --   --  19*   BUN  --  70*  --   --  61*  --   --  49*   CR  --  3.39*  --   --  3.13*  --   --  2.38*   GLC  --  97  --   --  98  --   --  105*   MATTHEW  --  8.4*  --   --  8.5  --   --  8.1*   MAG  --  2.7*  --   --  2.6* 2.9*   < > 2.8*   PHOS  --  4.3  --   --  4.9*  --   --   --     < > = values in this interval not displayed.       CBC -   Recent Labs   Lab Test 10/27/21  0559 10/26/21  0554 10/24/21  1038   WBC 9.5 9.4 9.5   HGB 8.1* 8.1* 8.5*    219 272       LFTs -   Recent Labs   Lab Test 10/27/21  0559 10/26/21  0554 10/25/21  0244   ALKPHOS 199* 211* 149   BILITOTAL 1.0 0.9 1.0   * 801* 606*   * 764* 813*   PROTTOTAL 6.7* 6.6* 6.5*   ALBUMIN 2.9* 3.0* 2.9*       Iron Panel -   Recent Labs   Lab Test 10/25/21  1013   JACQUELINE 1,561*           Current Medications:    amiodarone  200 mg Oral Daily     aspirin  325 mg Oral Once    Or     aspirin  243 mg Oral Once     aspirin  81 mg Oral Daily     clopidogrel  75 mg Oral Daily     isosorbide mononitrate  30 mg Oral At Bedtime     metoprolol succinate ER  50 mg Oral BID     pantoprazole  40 mg Oral BID     potassium chloride  20 mEq Oral Once     [Held by provider] rosuvastatin   40 mg Oral At Bedtime     sodium chloride (PF)  3 mL Intracatheter Q8H       - MEDICATION INSTRUCTIONS -       - MEDICATION INSTRUCTIONS -       - MEDICATION INSTRUCTIONS -       ACE/ARB/ARNI NOT PRESCRIBED       sodium chloride       Monae Fletcher MD

## 2021-10-28 ENCOUNTER — APPOINTMENT (OUTPATIENT)
Dept: OCCUPATIONAL THERAPY | Facility: CLINIC | Age: 76
DRG: 280 | End: 2021-10-28
Payer: MEDICARE

## 2021-10-28 VITALS
HEIGHT: 66 IN | WEIGHT: 154 LBS | DIASTOLIC BLOOD PRESSURE: 63 MMHG | HEART RATE: 50 BPM | SYSTOLIC BLOOD PRESSURE: 112 MMHG | RESPIRATION RATE: 16 BRPM | BODY MASS INDEX: 24.75 KG/M2 | OXYGEN SATURATION: 99 % | TEMPERATURE: 97.6 F

## 2021-10-28 LAB
ALBUMIN SERPL-MCNC: 3 G/DL (ref 3.4–5)
ALP SERPL-CCNC: 181 U/L (ref 40–150)
ALT SERPL W P-5'-P-CCNC: 576 U/L (ref 0–70)
ANION GAP SERPL CALCULATED.3IONS-SCNC: 8 MMOL/L (ref 3–14)
AST SERPL W P-5'-P-CCNC: 238 U/L (ref 0–45)
BASOPHILS # BLD AUTO: 0.1 10E3/UL (ref 0–0.2)
BASOPHILS NFR BLD AUTO: 1 %
BILIRUB DIRECT SERPL-MCNC: 0.4 MG/DL (ref 0–0.2)
BILIRUB SERPL-MCNC: 1 MG/DL (ref 0.2–1.3)
BUN SERPL-MCNC: 70 MG/DL (ref 7–30)
CALCIUM SERPL-MCNC: 8.5 MG/DL (ref 8.5–10.1)
CERULOPLASMIN SERPL-MCNC: 42 MG/DL (ref 20–60)
CHLORIDE BLD-SCNC: 100 MMOL/L (ref 94–109)
CO2 SERPL-SCNC: 25 MMOL/L (ref 20–32)
CREAT SERPL-MCNC: 3.31 MG/DL (ref 0.66–1.25)
EOSINOPHIL # BLD AUTO: 0.4 10E3/UL (ref 0–0.7)
EOSINOPHIL NFR BLD AUTO: 5 %
ERYTHROCYTE [DISTWIDTH] IN BLOOD BY AUTOMATED COUNT: 15.6 % (ref 10–15)
GFR SERPL CREATININE-BSD FRML MDRD: 17 ML/MIN/1.73M2
GLUCOSE BLD-MCNC: 104 MG/DL (ref 70–99)
HCT VFR BLD AUTO: 25 % (ref 40–53)
HGB BLD-MCNC: 8.1 G/DL (ref 13.3–17.7)
IMM GRANULOCYTES # BLD: 0.1 10E3/UL
IMM GRANULOCYTES NFR BLD: 1 %
LYMPHOCYTES # BLD AUTO: 1.7 10E3/UL (ref 0.8–5.3)
LYMPHOCYTES NFR BLD AUTO: 17 %
MAGNESIUM SERPL-MCNC: 2.7 MG/DL (ref 1.6–2.3)
MCH RBC QN AUTO: 30 PG (ref 26.5–33)
MCHC RBC AUTO-ENTMCNC: 32.4 G/DL (ref 31.5–36.5)
MCV RBC AUTO: 93 FL (ref 78–100)
MONOCYTES # BLD AUTO: 1 10E3/UL (ref 0–1.3)
MONOCYTES NFR BLD AUTO: 10 %
NEUTROPHILS # BLD AUTO: 6.5 10E3/UL (ref 1.6–8.3)
NEUTROPHILS NFR BLD AUTO: 66 %
NRBC # BLD AUTO: 0 10E3/UL
NRBC BLD AUTO-RTO: 0 /100
PHOSPHATE SERPL-MCNC: 3.5 MG/DL (ref 2.5–4.5)
PLATELET # BLD AUTO: 178 10E3/UL (ref 150–450)
POTASSIUM BLD-SCNC: 3.7 MMOL/L (ref 3.4–5.3)
POTASSIUM BLD-SCNC: 3.9 MMOL/L (ref 3.4–5.3)
PROT SERPL-MCNC: 6.7 G/DL (ref 6.8–8.8)
RBC # BLD AUTO: 2.7 10E6/UL (ref 4.4–5.9)
SODIUM SERPL-SCNC: 133 MMOL/L (ref 133–144)
WBC # BLD AUTO: 9.7 10E3/UL (ref 4–11)

## 2021-10-28 PROCEDURE — 99233 SBSQ HOSP IP/OBS HIGH 50: CPT | Performed by: INTERNAL MEDICINE

## 2021-10-28 PROCEDURE — 84100 ASSAY OF PHOSPHORUS: CPT | Performed by: INTERNAL MEDICINE

## 2021-10-28 PROCEDURE — 84155 ASSAY OF PROTEIN SERUM: CPT | Performed by: PHYSICIAN ASSISTANT

## 2021-10-28 PROCEDURE — 250N000013 HC RX MED GY IP 250 OP 250 PS 637: Performed by: INTERNAL MEDICINE

## 2021-10-28 PROCEDURE — 82247 BILIRUBIN TOTAL: CPT | Performed by: PHYSICIAN ASSISTANT

## 2021-10-28 PROCEDURE — 99232 SBSQ HOSP IP/OBS MODERATE 35: CPT | Performed by: INTERNAL MEDICINE

## 2021-10-28 PROCEDURE — 83735 ASSAY OF MAGNESIUM: CPT | Performed by: INTERNAL MEDICINE

## 2021-10-28 PROCEDURE — 85025 COMPLETE CBC W/AUTO DIFF WBC: CPT | Performed by: INTERNAL MEDICINE

## 2021-10-28 PROCEDURE — 99239 HOSP IP/OBS DSCHRG MGMT >30: CPT | Performed by: STUDENT IN AN ORGANIZED HEALTH CARE EDUCATION/TRAINING PROGRAM

## 2021-10-28 PROCEDURE — 99231 SBSQ HOSP IP/OBS SF/LOW 25: CPT | Performed by: INTERNAL MEDICINE

## 2021-10-28 PROCEDURE — 82248 BILIRUBIN DIRECT: CPT | Performed by: PHYSICIAN ASSISTANT

## 2021-10-28 PROCEDURE — 84450 TRANSFERASE (AST) (SGOT): CPT | Performed by: PHYSICIAN ASSISTANT

## 2021-10-28 PROCEDURE — 84075 ASSAY ALKALINE PHOSPHATASE: CPT | Performed by: PHYSICIAN ASSISTANT

## 2021-10-28 PROCEDURE — 97110 THERAPEUTIC EXERCISES: CPT | Mod: GO | Performed by: OCCUPATIONAL THERAPIST

## 2021-10-28 PROCEDURE — 36415 COLL VENOUS BLD VENIPUNCTURE: CPT | Performed by: INTERNAL MEDICINE

## 2021-10-28 PROCEDURE — 36415 COLL VENOUS BLD VENIPUNCTURE: CPT | Performed by: STUDENT IN AN ORGANIZED HEALTH CARE EDUCATION/TRAINING PROGRAM

## 2021-10-28 PROCEDURE — 80069 RENAL FUNCTION PANEL: CPT | Performed by: STUDENT IN AN ORGANIZED HEALTH CARE EDUCATION/TRAINING PROGRAM

## 2021-10-28 RX ORDER — AMIODARONE HYDROCHLORIDE 200 MG/1
200 TABLET ORAL DAILY
Qty: 60 TABLET | Refills: 0 | Status: SHIPPED | OUTPATIENT
Start: 2021-10-28

## 2021-10-28 RX ORDER — BUMETANIDE 1 MG/1
1 TABLET ORAL DAILY
Qty: 60 TABLET | Refills: 0 | Status: SHIPPED | OUTPATIENT
Start: 2021-10-28 | End: 2021-10-28

## 2021-10-28 RX ORDER — POTASSIUM CHLORIDE 1500 MG/1
20 TABLET, EXTENDED RELEASE ORAL DAILY
Qty: 60 TABLET | Refills: 0 | Status: SHIPPED | OUTPATIENT
Start: 2021-10-29

## 2021-10-28 RX ORDER — AMIODARONE HYDROCHLORIDE 200 MG/1
200 TABLET ORAL 2 TIMES DAILY
Qty: 60 TABLET | Refills: 0 | Status: SHIPPED | OUTPATIENT
Start: 2021-10-28 | End: 2021-10-28

## 2021-10-28 RX ORDER — BUMETANIDE 1 MG/1
1 TABLET ORAL 2 TIMES DAILY
Qty: 60 TABLET | Refills: 0 | Status: SHIPPED | OUTPATIENT
Start: 2021-10-28

## 2021-10-28 RX ADMIN — AMIODARONE HYDROCHLORIDE 200 MG: 200 TABLET ORAL at 09:10

## 2021-10-28 RX ADMIN — POTASSIUM CHLORIDE 20 MEQ: 1500 TABLET, EXTENDED RELEASE ORAL at 09:09

## 2021-10-28 RX ADMIN — PANTOPRAZOLE SODIUM 40 MG: 40 TABLET, DELAYED RELEASE ORAL at 09:09

## 2021-10-28 RX ADMIN — Medication 1 MG: at 02:04

## 2021-10-28 RX ADMIN — CLOPIDOGREL BISULFATE 75 MG: 75 TABLET ORAL at 09:09

## 2021-10-28 RX ADMIN — ASPIRIN 81 MG: 81 TABLET, COATED ORAL at 09:09

## 2021-10-28 RX ADMIN — METOPROLOL SUCCINATE 50 MG: 50 TABLET, EXTENDED RELEASE ORAL at 09:09

## 2021-10-28 ASSESSMENT — ACTIVITIES OF DAILY LIVING (ADL)
ADLS_ACUITY_SCORE: 8
ADLS_ACUITY_SCORE: 8
ADLS_ACUITY_SCORE: 4
ADLS_ACUITY_SCORE: 8
ADLS_ACUITY_SCORE: 4
ADLS_ACUITY_SCORE: 8
ADLS_ACUITY_SCORE: 8
ADLS_ACUITY_SCORE: 4
ADLS_ACUITY_SCORE: 8
ADLS_ACUITY_SCORE: 8
ADLS_ACUITY_SCORE: 4
ADLS_ACUITY_SCORE: 8

## 2021-10-28 ASSESSMENT — MIFFLIN-ST. JEOR: SCORE: 1381.29

## 2021-10-28 NOTE — PLAN OF CARE
VSS on RA. Tele: SR/SB with BBB and 1st degree AVB. Up independently without issue, gait steady. Minimal sleep overnight, PRN Ambien and melatonin given. Possible transition to PO diuretics today pending kidney function.

## 2021-10-28 NOTE — PROGRESS NOTES
Reviewed with salo Chisholm seen and examined.  No VT over the last 24 hrs. Sinus rate is about 60 bpm.  He states feeling better.     AST/ALT remains elevated but is trending down. Not able to stop amiodarone because of lack of an alternative. Sotalol is a poor choice of ongoing CHF and renal dysfunction. Amiodarone is reduced to 200 mg daily. Will accept occasional nonsustained VT before amiodarone reaches full effect. Continue to monitor AST/ALT.  Low potassium. Agree for replacement.  Creatinine is up to 3.39. He has lost about 8 lb since the admission. Agree for consideration of po diuretics.    76 year-old white male, presented to the ER with CP and SOB. Found to be in wide QRS tachycardia. ECG tracings reviewed, consistent with LV VT.  Known CAD with CABG 15 years ago and stenting a few years ago. Not following cardiology regularly.  Current EF is reported 40%.  Troponin elevation up to 3.46. No chest pain in sinus rhythm now.  PMH: AAA, hypertension, SIADH, PAD.  Anemia with stable Hb. On aspirin and Plavix.  DNR/DNI.     Recommend:  Continue po amiodarone.   No ICD because of DNR/DNI code status.  Return to his previous cardiologist for follow up.

## 2021-10-28 NOTE — PROGRESS NOTES
Follow Up with Dr. Hammond's Nurse Practioner; Treasure Sneed on Monday, Nov. 8th.  BMP Labs at 9:30 AM and 10 AM appointment with Treasure at:     [facility])   [address] [facility]   [address]   [place] [address]   [phone]     Lupe De La Rosa RN  Care Coordinator  [facility]  [phone] (text or call)

## 2021-10-28 NOTE — PLAN OF CARE
Occupational Therapy Discharge Summary    Reason for therapy discharge:    All goals and outcomes met, no further needs identified.    Progress towards therapy goal(s). See goals on Care Plan in [facility] electronic health record for goal details.  Goals met    Therapy recommendation(s):    Continued therapy is recommended.  Rationale/Recommendations:  Anticipate pt will be able to return home with A with strenuous IADL's ie shopping, cooking, cleaning, laundry, etc and OP CR at [facility] for monitored progressive exercise and risk factor education and modification due to recent angio. OP CR orders entered in epic.

## 2021-10-28 NOTE — PLAN OF CARE
Given discharge  instructions papers. Given discontinue meds. IV dcd. Tel off. Waiting for ride home

## 2021-10-28 NOTE — PROGRESS NOTES
[facility]    Cardiology Progress Note    Date of Service: 10/28/2021     Assessment & Plan      Xavier Giordano is a 76 year old male with past medical history of CAD, CABG in 2005, CKD recent PCI at [facility], melena with hemoglobin drop recently at [facility], hyponatremia on that admission and Hydrochlorothiazide stopped, HTN, PAD (AAA), dyslipidemia, NSVT.  This patient was admitted on 10/24/2021 with symptoms of VINCENT, palpitations, and chest pressure.  DNR/DNI status    1. Nonstemi  HX CABG 2005(LIMA to LAD, SVG to first diagonal, SVG to OM1    Admitted to ED here 10-11 for laceration and left AMA as needed to take dog to vet and find care for his wife, then went to [facility] where he underwent PCI  -angiography X2     First demonstrated known  of the LAD and the right coronary artery.    - new 90% lesion of the proximal circumflex compared compared with the angiogram done in 2017.    -saphenous vein graft to the obtuse marginal branch was occluded with intraluminal thrombus.    - saphenous vein graft to the diagonal branch had a 90% degenerated lesion in the middle segment, but had REINA 3 flow.    -The LIMA to LAD was patent, supplying the LAD that was also filling the PDA via mainly septal collaterals.   Had SVG OM-2 thrombectomey with 48 hours of Eptfibatide then returned for-->  -PCI to SVG of  with ART (Xience Isamar rx 3.5 x 33mm), resolute [place] 3.5mm x 38mm, Xience 3.25mm x 38mm Xience and Xience 3.25 X 38mm  -staged diagonal PCI planned at [facility] to [place]  He also suffered a right groin ooze; left groin is clean; no hum or bruit on either side  Troponin peak 3.4    PLAN:  He states his chest pain on admission was more related to his tachycardia and not his previous chest pain PCI  No ANGIOGRAM due to SHAYE  Continue DAPT nitrates and betablockers      2. CKD with worsening creatinine   Creatinine at [facility] appeared to  have baseline of 1.4-1.5  Admitted here at 2.04-->2.38--> 3.13-->3.39-->3.31  Uncertain what his dye load was at [facility]  May be cardiorenal   4 days post contrast load his creatinine at his PMD was 1.4  Appreciate Nephrology consult  bumex drip 0.5mg stopped 10-26  Had one dose IV diuril 10-25  Nephrology will decide diuretic dose      3. HFrEF and diastolic dysfunction    BNP 19,795  Echo at [facility] on 10-11 showed EF 53% with WMA in inferior and posterior walls  Aortic sclerosis with mild to moderate AI, moderate MR and moderate TR  Echo-->EF 40-45% in inferolateral HK; moderate LVH with grade II diastolic dysfunction and moderate MR, mild AI  CXR  with mild interstitial prominence  Weight down 8 pounds  I/O net - 4300 liters  K 3.9  Less edema      4. Anemia with two days of Melena at [facility]  GI consult reportedly done at [facility] who did not recommend EGD  And thought hemoglobin drop in part was post instrumentation to right groin  mcv normal; platelets normal  Previous hemoglobins 11-12  8.1 today with elevated ferritin to 1561 MCV 91    4. Wide complex tachycardia--NSVT -  He was asymptomatic  EP following  Cannot use sotalol with HF   Amiodarone restarted per EP  No ICD due to DNR/DNI status    5 acute elevation of Liver enzymes and elevated INR  ast 57--> 813-->765-->419-->238  Alt 83-->606-->801-->703-->567  Crestor on hold  Ultrasound shows normal liver    May be some component of passive congestion but RV function read as normal on echo at [facility]  GI consult noted      6. Hypertension  Hydrochlorothiazide stopped recently due to low sodium  Sodium 133, up from 126 at [facility]    7. Dyslipidemia  Crestor on hold currently due to liver enzyme elevation   in March  Restart as OP    8. Social issues  He cares for his wife with dementia who is currently hospitalized and his sister is assisting    We will sign off; will try to move up his Cardiology follow up with [facility] to November versus December    Amy  Enoc, MSN, APRN, CNP  [facility]      Interval History   Less SOB  Edema improving        Review of Systems:  The Review of Systems is negative other than noted in the HPI    Physical Exam   Temp: 97.5  F (36.4  C) Temp src: Oral BP: 116/57 Pulse: 60   Resp: 16 SpO2: 99 % O2 Device: None (Room air)    Vitals:    10/26/21 0506 10/27/21 0332 10/28/21 0621   Weight: 70.5 kg (155 lb 8 oz) 68.6 kg (151 lb 4.8 oz) 69.9 kg (154 lb)     Vital Signs with Ranges  Temp:  [97.3  F (36.3  C)-97.5  F (36.4  C)] 97.5  F (36.4  C)  Pulse:  [52-63] 60  Resp:  [16-18] 16  BP: (106-133)/(52-62) 116/57  SpO2:  [93 %-100 %] 99 %  I/O last 3 completed shifts:  In: 720 [P.O.:720]  Out: 1675 [Urine:1675]    Constitutional     alert and oriented, in no acute distress.     Skin     warm and dry to touch    ENT     ++pallor or cyanosis    Neck    Supple, JVP elevated no carotid bruit    Chest     no tenderness to palpation     Lungs  clear    Cardiac  regular rhythm, S1 normal, S2 normal, No S3 or S4, 2 separate systolic murmurs RUSB and apex to axilla    Abdomen     abdomen soft, bowel sounds normoactive, no hepatosplenomegaly    Extremities and Back     no clubbing, cyanosis. 1-2+ pitting foot edema ; 1+ pretibial edema  Scrotal swelling      Neurological     no gross motor deficits noted, affect appropriate, oriented to time, person and place.        Medications     - MEDICATION INSTRUCTIONS -       - MEDICATION INSTRUCTIONS -       - MEDICATION INSTRUCTIONS -       ACE/ARB/ARNI NOT PRESCRIBED       sodium chloride         amiodarone  200 mg Oral Daily     aspirin  325 mg Oral Once    Or     aspirin  243 mg Oral Once     aspirin  81 mg Oral Daily     clopidogrel  75 mg Oral Daily     isosorbide mononitrate  30 mg Oral At Bedtime     metoprolol succinate ER  50 mg Oral BID     pantoprazole  40 mg Oral BID     potassium chloride  20 mEq Oral Daily     [Held by provider] rosuvastatin  40 mg Oral At Bedtime     sodium chloride  (PF)  3 mL Intracatheter Q8H          Data:     ROUTINE IP LABS (Last four results)  BMP  Recent Labs   Lab 10/28/21  0556 10/28/21  0130 10/27/21  2002 10/27/21  1201 10/27/21  0559 10/27/21  0559 10/26/21  1116 10/26/21  0554 10/25/21  0244 10/25/21  0244     --   --   --   --  131*  --  133  --  132*   POTASSIUM 3.9 3.7 3.4 3.3*   < > 3.0*   < > 3.2*   < > 3.9   CHLORIDE 100  --   --   --   --  96  --  97  --  102   MATTHEW 8.5  --   --   --   --  8.4*  --  8.5  --  8.1*   CO2 25  --   --   --   --  26  --  24  --  19*   BUN 70*  --   --   --   --  70*  --  61*  --  49*   CR 3.31*  --   --   --   --  3.39*  --  3.13*  --  2.38*   *  --   --   --   --  97  --  98  --  105*    < > = values in this interval not displayed.     CHOLESTEROL/HEPATIC  Recent Labs   Lab 10/28/21  0556 10/27/21  0559 10/26/21  0554 10/25/21  0244   * 703* 801* 606*   * 419* 764* 813*     CBC  Recent Labs   Lab 10/28/21  0556 10/27/21  0559 10/27/21  0559 10/26/21  0554 10/26/21  0554 10/24/21  1038 10/24/21  1038   WBC 9.7  --  9.5  --  9.4  --  9.5   RBC 2.70*  --  2.66*  --  2.65*  --  2.78*   HGB 8.1*   < > 8.1*   < > 8.1*   < > 8.5*   HCT 25.0*  --  24.2*  --  24.0*  --  26.0*   MCV 93  --  91  --  91  --  94   MCH 30.0  --  30.5  --  30.6  --  30.6   MCHC 32.4  --  33.5  --  33.8  --  32.7   RDW 15.6*  --  15.4*  --  15.4*  --  15.3*     --  185  --  219  --  272    < > = values in this interval not displayed.     TROP: No lab results found in last 7 days.   BNP:    Recent Labs   Lab 10/24/21  1038   NTBNPI 19,795*     INR  Recent Labs   Lab 10/25/21  0244 10/24/21  1038   INR 1.56* 1.32*     TSH   Date Value Ref Range Status   10/24/2021 1.66 0.40 - 4.00 mU/L Final       EKG results:  Reviewed if available     Imaging:  No results found for this or any previous visit (from the past 24 hour(s)).  Telemetry:  Sinus rhythm

## 2021-10-28 NOTE — PROGRESS NOTES
EP Progress Note          Assessment and Plan:   Xavier Oden is a 76 year old male with past medical history significant for CAD s/p CABG (2005), stent in VG (2017) and recent coronary stent placement and aspiration thrombectomy and angioplasty of VG to 1st obtuse marginal (10/2021), PAD with ischemic toe (cholesterol embolism at time of PCI) s/p 5th left toe amputation, rheumatic fever,previous tobacco abuse, and Agent Orange exposure. Pt was admitted on 10/24/2021 with chest pain with no relief from nitroglycerin, lower extremity edema, orthopnea, dyspnea on exertion.   At arrival to the ED patient's ECG showed wide complex tachycardia.  His troponin was at 0.228 admission and trended up to 3.4, potassium 3.3, creatinine 2.04, hgb 8.5, ProBNP was 19,795 at admission. EP was consulted to assist with arrhythmia management.    1. Wide complex tachycardia  EKG tracings consistent with LV VT  Currently on Amiodarone 200 mg every day  EF 40%  50 beat run of slow VT at 0730 on 10/27. No VT since that time.  Pt scheduled to see his primary cardiologist () at [facility] on 12/7.  Pt is a DNR/DNI      2. CAD  -CABG in 2005 with LIMA to LAD, SVG to the D1, SVG to OM1   -PCI to OM/SVG anastomosis 2017 in setting of NSTEMI  -S/p PCI to SVG of OM with ART (10/2021) at [facility]  -General cards following  -Trop peak 3.46   -PTA aspirin, Plavix 75 mg daily, Imdur 60 mg daily, metoprolol XL 50 mg BID, rosuvastatin 40 mg daily (statin on hold)  -Angiogram on hold due to increasing creatinine       3. HFpEF  General cards following  Wt 69. Kg (68.6 kg yesterday, admit 72.5 kg)  IV Bumex was stopped 10/26 and plan is to start oral diuretics today    Results for XAVIER ODEN (MRN [id number]) as of 10/28/2021 08:55   Ref. Range 10/24/2021 10:38 10/26/2021 05:54 10/27/2021 05:59 10/28/2021 05:56   Hemoglobin Latest Ref Range: 13.3 - 17.7 g/dL 8.5 (L) 8.1 (L) 8.1 (L) 8.1 (L)   Hematocrit Latest Ref Range: 40.0 - 53.0 % 26.0 (L) 24.0  (L) 24.2 (L) 25.0 (L)     4. CKD  Secondary to over diuresis and recent dye use for angio at [facility] hospital)  Baseline creat 1.4-1.6 before PCI, on admission was 2.04    Results for YESENIA ODEN (MRN [id number]) as of 10/28/2021 08:55   Ref. Range 10/28/2021 05:56   Potassium Latest Ref Range: 3.4 - 5.3 mmol/L 3.9   Urea Nitrogen Latest Ref Range: 7 - 30 mg/dL 70 (H)   Creatinine Latest Ref Range: 0.66 - 1.25 mg/dL 3.31 (H)   GFR Estimate Latest Ref Range: >60 mL/min/1.73m2 17 (L)       5. Elevated liver enzymes  Normal at admission  Amiodarone decreased to oral 200 mg daily   Results for YESENIA ODEN (MRN [id number]) as of 10/28/2021 08:55   Ref. Range 10/24/2021 10:38 10/25/2021 02:44 10/26/2021 05:54 10/27/2021 05:59 10/28/2021 05:56   Alkaline Phosphatase Latest Ref Range: 40 - 150 U/L 135 149 211 (H) 199 (H) 181 (H)   ALT Latest Ref Range: 0 - 70 U/L 83 (H) 606 (HH) 801 (HH) 703 (HH) 576 (HH)   AST Latest Ref Range: 0 - 45 U/L 57 (H) 813 (HH) 764 (HH) 419 (H) 238 (H)   Bilirubin Direct Latest Ref Range: 0.0 - 0.2 mg/dL  0.5 (H) 0.4 (H) 0.4 (H) 0.4 (H)       Pt stable from an EP stand point and will sign off. Please contact us with any concerns or questions. Pt will be following with his cardiologist at [facility].    Kathrine Gonzalez CNP        Interval History:   Pt states he is feeling well and hoping to go home today. Denies chest pain or sob. Admits he's concerned about his kidneys, but seeing his family doctor on Monday.   VSSA, tele sinus cresencio 50-60 bpm       Medications:       amiodarone  200 mg Oral Daily     aspirin  325 mg Oral Once    Or     aspirin  243 mg Oral Once     aspirin  81 mg Oral Daily     clopidogrel  75 mg Oral Daily     isosorbide mononitrate  30 mg Oral At Bedtime     metoprolol succinate ER  50 mg Oral BID     pantoprazole  40 mg Oral BID     potassium chloride  20 mEq Oral Daily     [Held by provider] rosuvastatin  40 mg Oral At Bedtime     sodium chloride (PF)  3 mL Intracatheter Q8H  "            Review of Systems: If done, described below  The Review of Systems is negative other than noted in the HPI             Physical Exam:   Blood pressure 116/57, pulse 60, temperature 97.5  F (36.4  C), temperature source Oral, resp. rate 16, height 1.676 m (5' 6\"), weight 69.9 kg (154 lb), SpO2 99 %.        Vital Sign Ranges  Temperature Temp  Av.4  F (36.3  C)  Min: 97.3  F (36.3  C)  Max: 97.5  F (36.4  C)   Blood pressure Systolic (24hrs), Av , Min:106 , Max:133        Diastolic (24hrs), Av, Min:52, Max:62      Pulse Pulse  Av  Min: 52  Max: 63   Respirations Resp  Av.8  Min: 16  Max: 18   Pulse oximetry SpO2  Av.4 %  Min: 93 %  Max: 100 %         Intake/Output Summary (Last 24 hours) at 10/28/2021 0849  Last data filed at 10/28/2021 0800  Gross per 24 hour   Intake 720 ml   Output 1550 ml   Net -830 ml       Constitutional:   in no apparent distress     Lungs:   Clear with few scattered crackles in bases     Cardiovascular:   regular rate and rhythm, normal S1 and S2 with systolic murmur     Extremities and Back:   Edema 2+              Data:     Lab Results   Component Value Date    WBC 9.7 10/28/2021    HGB 8.1 (L) 10/28/2021    HCT 25.0 (L) 10/28/2021     10/28/2021     10/28/2021    POTASSIUM 3.9 10/28/2021    CHLORIDE 100 10/28/2021    CO2 25 10/28/2021    BUN 70 (H) 10/28/2021    CR 3.31 (H) 10/28/2021     (H) 10/28/2021    NTBNPI 19,795 (H) 10/24/2021    TROPONIN 2.983 (HH) 10/25/2021     (H) 10/28/2021     (HH) 10/28/2021    ALKPHOS 181 (H) 10/28/2021    BILITOTAL 1.0 10/28/2021    INR 1.56 (H) 10/25/2021          "

## 2021-10-28 NOTE — PROGRESS NOTES
"[facility]UAL [facility] Progress Note    [facility]  Referral Source: Length of Stay    Intervention: Reviewed documentation and spoke with Xavier Giordano who said he was doing \"alethea,\" waiting for discharge, and declined a visit.    Plan: [facility] remains available.     Randi Marie     Intern      "

## 2021-10-28 NOTE — PROGRESS NOTES
Nephrology Progress Note  10/28/2021         Assessment & Recommendations:   Xavier Giordano is a 76 year old male with past medical history of coronary artery disease status post CABG in 2005, hypertension, hyperlipidemia, congestive heart failure -admitted on 10/24 with chest pain, shortness of breath, wide-complex tachycardia     1 CKD 3-baseline creatinine around 1.3-1.4.  Creatinine was down to 1.4 on 10/18 after recent PCI.  Now admitted with creatinine of 2 and up to 3.3 with diuresis   -Creatinine bump in setting of wide-complex tachycardia, unstable hemodynamics, diuresis. Contrast use.  Nonoliguric  -Recent UA without any proteinuria or hematuria     2 coronary artery disease/wide-complex tachycardia-recent PCI earlier this month to SVG.  Cardiology holding off angiogram given resolution of chest pain as well as rising creatinine.  -On oral amiodarone  -Troponin trending down  -Noted no plans for ICD given DNR/DNI status     3 heart failure with preserved ejection fraction - Clinically , he is clearly in CHF and has significant improvement in symptoms with diuresis.  Creatinine however has trended up with brisk diuresis.    - Much better with diuresis. Still total volume up . Will need maintenance diuretics      4 raise LFT-transaminases.  Possibly related to heart failure.  Monitor.  Amiodarone dose decreased. Statin on hold     Recc-  Okay to discharge home from renal standpoint.   Send home on Bumex 1mg BID  Pt advised to weight himself daily and call [facility] if gaining more than 2 lbs per day   I will try to set him up with Nephrology clinic with us in next 2-3 wks.   Low salt diet. PO fluid restriction to < 1.5 L / day   F/u with PCP next week with labs , daily weight and BP.     D/w Primary team       Monae Fletcher MD  [facility] - Nephrology   [phone]      Interval History :   Seen / examined.   Reports feeling much better.  Off oxygen. Walking around without issues. Eager to go  home  Leg swelling and scrotal swelling is significantly lower.  Modest UOP despite holding Bumex, 1. 6L   Cr stable        Review of Systems:   A 4 point review of systems was negative except as noted above.  Notably: fair appetite. no nausea or vomiting or diarrhea.  no confusion,  no fever or chills    Physical Exam:   I/O last 3 completed shifts:  In: 720 [P.O.:720]  Out: 1675 [Urine:1675]    GENERAL APPEARANCE: no distress,  awake  Endo: no moon facies, no goiter  Pulmonary: crackles b/l base  CV: regular rhythm, normal rate, no rub   - JVP +   - Edema +, pitting.  Scrotal edema plus  GI: soft, nontender,   MS: no evidence of inflammation in joints  SKIN: no rash, warm, dry, no cyanosis  NEURO: face symmetric, no asterixis        Labs:   All labs reviewed by me  Electrolytes/Renal -   Recent Labs   Lab Test 10/28/21  0556 10/28/21  0130 10/27/21  2002 10/27/21  1201 10/27/21  0559 10/26/21  1116 10/26/21  0554     --   --   --  131*  --  133   POTASSIUM 3.9 3.7 3.4   < > 3.0*   < > 3.2*   CHLORIDE 100  --   --   --  96  --  97   CO2 25  --   --   --  26  --  24   BUN 70*  --   --   --  70*  --  61*   CR 3.31*  --   --   --  3.39*  --  3.13*   *  --   --   --  97  --  98   MATTHEW 8.5  --   --   --  8.4*  --  8.5   MAG 2.7*  --   --   --  2.7*  --  2.6*   PHOS 3.5  --   --   --  4.3  --  4.9*    < > = values in this interval not displayed.       CBC -   Recent Labs   Lab Test 10/28/21  0556 10/27/21  0559 10/26/21  0554   WBC 9.7 9.5 9.4   HGB 8.1* 8.1* 8.1*    185 219       LFTs -   Recent Labs   Lab Test 10/28/21  0556 10/27/21  0559 10/26/21  0554   ALKPHOS 181* 199* 211*   BILITOTAL 1.0 1.0 0.9   * 703* 801*   * 419* 764*   PROTTOTAL 6.7* 6.7* 6.6*   ALBUMIN 3.0* 2.9* 3.0*       Iron Panel -   Recent Labs   Lab Test 10/25/21  1013   JACQUELINE 1,561*           Current Medications:    amiodarone  200 mg Oral Daily     aspirin  325 mg Oral Once    Or     aspirin  243 mg Oral Once      aspirin  81 mg Oral Daily     clopidogrel  75 mg Oral Daily     isosorbide mononitrate  30 mg Oral At Bedtime     metoprolol succinate ER  50 mg Oral BID     pantoprazole  40 mg Oral BID     potassium chloride  20 mEq Oral Daily     [Held by provider] rosuvastatin  40 mg Oral At Bedtime     sodium chloride (PF)  3 mL Intracatheter Q8H       - MEDICATION INSTRUCTIONS -       - MEDICATION INSTRUCTIONS -       - MEDICATION INSTRUCTIONS -       ACE/ARB/ARNI NOT PRESCRIBED       sodium chloride       Monae Fletcher MD

## 2021-10-28 NOTE — PROGRESS NOTES
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     Elevated liver transaminases  ->211->199->181, ->801->703->564, ->764->419->238  Had acute cardiac event with paroxysmal ventricular tachycardia patient has history of chronic congestive heart failure with acute decompensation and significant fluid overload patient is currently being diuresed. Started on amiodarone.  differential for patient's elevated acute hepatitis is most likely drug-induced combination of amiodarone along with statins other differential diagnosis would include ischemic liver injury due to recent cardiac event congestive heart failure and arrhythmia.  Viral hepatitis workup negative Hep B,C and autoimmune markers negative.     -- Continue to trend LFTs daily  -- Hold Crestor  -- GI will continue to follow along            Interval History:   no new complaints, doing well and doing well; no cp, sob, n/v/d, or abd pain.              Review of Systems:   C: NEGATIVE for fever, chills, change in weight  E/M: NEGATIVE for ear, mouth and throat problems  R: NEGATIVE for significant cough or SOB  CV: NEGATIVE for chest pain, palpitations or peripheral edema             Medications:   I have reviewed this patient's current medications    amiodarone  200 mg Oral Daily     aspirin  325 mg Oral Once     Or     aspirin  243 mg Oral Once     aspirin  81 mg Oral Daily     clopidogrel  75 mg Oral Daily     isosorbide mononitrate  30 mg Oral At Bedtime     metoprolol succinate ER  50 mg Oral BID     pantoprazole  40 mg Oral BID     potassium chloride  20 mEq Oral Daily     [Held by provider] rosuvastatin  40 mg Oral At Bedtime     sodium chloride (PF)  3 mL Intracatheter Q8H                  Physical Exam:   Vitals were reviewed  Vital Signs with Ranges  Temp:  [97.3  F (36.3  C)-97.5  F (36.4  C)] 97.5  F (36.4  C)  Pulse:  [52-63] 60  Resp:  [16-18] 16  BP: (106-133)/(52-62) 116/57  SpO2:  [93 %-100 %] 99 %  I/O last 3 completed shifts:  In: 720 [P.O.:720]  Out: 1675 [Urine:1675]  Constitutional: healthy, alert and no distress   Cardiovascular: negative, PMI normal. No lifts, heaves, or thrills. RRR. No murmurs, clicks gallops or rub  Respiratory: negative, Percussion normal. Good diaphragmatic excursion. Lungs clear  Abdomen: Abdomen soft, non-tender. BS normal. No masses, organomegaly           Data:   I reviewed the patient's new clinical lab test results.   Recent Labs   Lab Test 10/28/21  0556 10/27/21  0559 10/26/21  0554 10/25/21  0244 10/24/21  1038 10/24/21  1038   WBC 9.7 9.5 9.4  --    < > 9.5   HGB 8.1* 8.1* 8.1*  --    < > 8.5*   MCV 93 91 91  --    < > 94    185 219  --    < > 272   INR  --   --   --  1.56*  --  1.32*    < > = values in this interval not displayed.     Recent Labs   Lab Test 10/28/21  0556 10/28/21  0130 10/27/21  2002 10/27/21  1201 10/27/21  0559 10/26/21  1116 10/26/21  0554   POTASSIUM 3.9 3.7 3.4   < > 3.0*   < > 3.2*   CHLORIDE 100  --   --   --  96  --  97   CO2 25  --   --   --  26  --  24   BUN 70*  --   --   --  70*  --  61*   ANIONGAP 8  --   --   --  9  --  12    < > = values in this interval not displayed.     Recent Labs   Lab Test 10/28/21  0556 10/27/21  0559 10/26/21  0554   ALBUMIN 3.0* 2.9* 3.0*   BILITOTAL 1.0 1.0 0.9   * 703* 801*   * 419*  764*       I reviewed the patient's new imaging results.    All laboratory data reviewed  All imaging studies reviewed by me.    Lata Arvizu PA-C,  10/28/2021  [facility]  Office : [phone]  Cell: [phone] (Dr. Joe)  Cell: [phone] (Lata Arvizu PA-C)

## 2021-10-28 NOTE — DISCHARGE SUMMARY
[facility]  Hospitalist Discharge Summary      Date of Admission:  10/24/2021  Date of Discharge:  10/28/2021  Discharging Provider: Myron Herrera MD      Discharge Diagnoses     Paroxysmal ventricular tachycardia  Acute on chronic diastolic congestive heart failure exacerbation (HFpEF)    Follow-ups Needed After Discharge   Follow-up Appointments     Follow-up and recommended labs and tests       Follow Up with Dr. Hammond's Nurse Practioner; Treasure Sneed on Monday,   Nov. 8th.  BMP Labs at 9:30 AM and 10 AM appointment with Treasure at:    [facility])  [address]  [address]  [place] [address]  [phone]         Follow-up and recommended labs and tests       Follow up with primary care provider, Natty Crook, within 7 days for   hospital follow- up.  The following labs/tests are recommended: LFTs           Unresulted Labs Ordered in the Past 30 Days of this Admission     No orders found from 9/24/2021 to 10/25/2021.        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      Paroxysmal ventricular tachycardia  He was started on IV amiodarone drip and now switch to oral amiodarone, dose decrease  because of elevated  LFT's  Evaluated by Cardiology and EP, no ICD as patient is DNR/DNI.  Plan:  - Continue lower dose of Amiodarone as per EP as no other good alternative.   - Start on K-dur 20 meq daily          NSTEMI  Acute on chronic diastolic congestive heart failure exacerbation (HFpEF)  Coronary artery disease, multivessel, with history of CABG (LIMA to LAD, SVG to 1st diagonal, SVG to OM1) s/p thrombectomy and angioplasty SVG to OM1 10/11/21, ART to SVG to OM1 10/14/21  Hypertension  Dyslipidemia   *Presents with progressive lower extremity edema, orthopnea, dyspnea on exertion.   *CXR with mild interstitial prominence suggestive of CHF, clinically consistent with symptoms  *Recently admitted 10/11-10/16/21 at [facility], with angiogram x2 and stent placement as above  *Diuresed for HFpEF during that admission, though diuretics held due to creatinine rise (though occurring following angiogram, so possibly BREANNE component). Eventually started on furosemide 20 mg daily-> increased to 40 mg BID by PCP as outpatient 10/22.   *Most recent EF 53% on echocardiogram 10/11/21  *Troponin 0.228, similar elevation seen during recent admission, though with report of chest pressure and ventricular arrhythmias new episode of ACS possible   He was started on IV lasix 80 mg TID on admission, switched to IV Bumex infusion with good diuresis  Plan:  - Bumed 1mg BID at discharge  - Cardiology consulted  - Troponin trending up, but creatinine increase to 3.13  - Repeat echocardiogram shows EF of 40-45%, inferolateral wall akinesis. Moderate MR   - Continue prior to admission aspirin, clopidogrel, rosuvastatin, metoprolol XL  - Resume Crestor as outpatient pending repeat LFTs.      Hyponatremia  Sodium 132. 133 at time of discharge 10/18.  Clinically hypervolemic.  - Hyponatremia likely secondary to CHF and poor prognostic factor, improved with diuresis     Hypokalemia  On admission K 3.3, lower again, like to keep it about 4,  Start him on low dose oral K-dur 20 meq daily     Acute Hepatitis likely secondary to Amiodarone with element of CHF/ischemic injury as well.   AST and ALT elevation, was AST 57, ALT 83. On admission now  increase to 813 and 606, continue to worse  Now ALT 10X its baseline and AST >10x its baseline, I think this happen after IV amiodarone and possible ischemic injury.  Oral Amiodarone dose now decrease, Tranasminases are trending down now, continue to monitor.    otal bilirubin normal, INR mildly elevated at 1.56.  Concern for congestive hepatopathy in the setting of CHF but I think this is more likely secondary to amiodarone.    US RUQ done today shows mild non specific gallbladder wall thickening, no gallstone, normal liver and no biliary ductal dilatation, will follow his LFT's. hepatitis b, hepatitis c, ferritin, ceruloplasmin, SCOTT, ASMA, AMA sent  Plan:  Amiodarone dose decrease by EP   LFTs as outpatient     Acute kidney injury on chronic kidney disease, stage 3  Baseline creatinine 1.4-1.6 with GFR in 40s. Creatinine 2.04 on admission. Mild SHAYE with peak of 1.72 10/13/21 during recent admission, possibly due to diuresis though timing consistent with BREANNE from angiogram as well.  Clinically hypervolemic, thus concerning for cardiorenal physiology, creatinine up to 3.39 with diuresis.   Nephrology consulted  - Avoid nephrotoxins     Recent acute blood loss anemia  Hemoglobin 8.5 g/dl, stable from recent discharge of 8.7 g/dl.  Was seen by gastroenterology during recent admission however no EGD was recommended and cardiology had attributed his drop in hemoglobin to blood loss from access site following first angiogram.    Consultations This Hospital Stay   PHARMACY IP CONSULT  PHARMACY IP CONSULT  CORE CLINIC EVALUATION IP CONSULT  OCCUPATIONAL THERAPY ADULT IP CONSULT  NUTRITION SERVICES ADULT IP CONSULT  CARE MANAGEMENT / SOCIAL WORK IP CONSULT  CARDIOLOGY IP CONSULT  PHARMACY IP CONSULT  PHARMACY IP CONSULT  NEPHROLOGY IP CONSULT  NEPHROLOGY IP CONSULT  GASTROENTEROLOGY IP CONSULT  SPEECH LANGUAGE PATH ADULT IP CONSULT    Code Status   No CPR- Do NOT Intubate    Time Spent on this Encounter   I, Myron Herrera MD,  personally saw the patient today and spent greater than 30 minutes discharging this patient.       Myron Herrera MD  [facility] CORONARY [facility]ARE UN[facility]  [address]  [place] [address]  Phone: [phone]  ______________________________________________________________________    Physical Exam   Vital Signs: Temp: 97.6  F (36.4  C) Temp src: Oral BP: 112/63 Pulse: 50   Resp: 16 SpO2: 99 % O2 Device: None (Room air)    Weight: 154 lbs 0 oz       Primary Care Physician   Natty Crook    Discharge Orders      CARDIAC REHAB REFERRAL      Follow-up and recommended labs and tests     Follow Up with Dr. Hammond's Nurse Practioner; Treasure Sneed on Monday, Nov. 8th.  BMP Labs at 9:30 AM and 10 AM appointment with Treasure at:    [facility])  [address]  [address]  [place] [address]  [phone]     Reason for your hospital stay    You had shortness of breath, palpitations, and chest pressure. You were seen by our kidney specialist and cardiology teams.     Follow-up and recommended labs and tests     Follow up with primary care provider, Natty Crook, within 7 days for hospital follow- up.  The following labs/tests are recommended: LFTs     Activity    Your activity upon discharge: activity as tolerated     Diet    Follow this diet upon discharge: Orders Placed This Encounter      2 Gram Sodium Diet Other - please comment       Significant Results and Procedures   Most Recent 3 CBC's:Recent Labs   Lab Test 10/28/21  0556 10/27/21  0559 10/26/21  0554   WBC 9.7 9.5 9.4   HGB 8.1* 8.1* 8.1*   MCV 93 91 91    185 219     Most Recent 3 BMP's:Recent Labs   Lab Test 10/28/21  0556 10/28/21  0130 10/27/21  2002 10/27/21  1201 10/27/21  0559 10/26/21  1116 10/26/21  0554     --   --   --  131*  --  133   POTASSIUM 3.9 3.7 3.4   < > 3.0*   < > 3.2*   CHLORIDE 100  --   --   --  96  --  97   CO2 25  --   --   --  26  --  24   BUN 70*  --   --    --  70*  --  61*   CR 3.31*  --   --   --  3.39*  --  3.13*   ANIONGAP 8  --   --   --  9  --  12   MATTHEW 8.5  --   --   --  8.4*  --  8.5   *  --   --   --  97  --  98    < > = values in this interval not displayed.     Most Recent 2 LFT's:Recent Labs   Lab Test 10/28/21  0556 10/27/21  0559   * 419*   * 703*   ALKPHOS 181* 199*   BILITOTAL 1.0 1.0     Most Recent 3 INR's:Recent Labs   Lab Test 10/25/21  0244 10/24/21  1038   INR 1.56* 1.32*     Most Recent 3 Troponin's:Recent Labs   Lab Test 10/25/21  1013 10/24/21  2007 10/24/21  1601   TROPONIN 2.983* 3.460* 1.931*     Most Recent 3 BNP's:Recent Labs   Lab Test 10/24/21  1038   NTBNPI 19,795*   ,   Results for orders placed or performed during the hospital encounter of 10/24/21   XR Chest Port 1 View    Narrative    EXAM: XR CHEST PORT 1 VIEW  LOCATION: [facility]  DATE/TIME: 10/24/2021 10:40 AM    INDICATION: Chest pain. Evaluate CHF.  COMPARISON: None.      Impression    IMPRESSION: Artifact and overlying material mildly compromise exam.    Mild interstitial prominence could be from vascular congestion, though ultimately nonspecific. Mild airway thickening. No focal consolidation or pneumonia.    Upper normal heart size, exaggerated by technique. Sternotomy. CABG. Aortic atherosclerosis.    No pleural effusion or pneumothorax.     US Abdomen Limited    Narrative    US ABDOMEN LIMITED 10/25/2021 9:08 AM    CLINICAL HISTORY: elevated LFTs, elevated LFTs  TECHNIQUE: Limited abdominal ultrasound.    COMPARISON: None.    FINDINGS:    GALLBLADDER: No gallstones. Mild nonspecific wall thickening to 3 mm.  No pericholecystic fluid. Sonographic Ramos sign is negative.    BILE DUCTS: There is no biliary dilatation. The common duct measures  2mm.    LIVER: Normal.    RIGHT KIDNEY: Normal. No hydronephrosis.    PANCREAS: The visualized portions of the pancreas are normal.      Impression    IMPRESSION:  1.  Mild nonspecific  gallbladder wall thickening. No gallstones or  evidence for acute cholecystitis.     2.  Normal liver. No biliary ductal dilatation.    ARMIDA VERDUGO MD         SYSTEM ID:  [id number]   XR Chest 2 Views    Narrative    EXAM: XR CHEST 2 VW  LOCATION: [facility]  DATE/TIME: 10/26/2021 5:34 PM    INDICATION: CHF.  COMPARISON: 10/24/2021.      Impression    IMPRESSION: Increased bilateral basilar atelectasis and/or infiltrate. New small left effusion. Mild vascular congestion.   Echocardiogram Limited     Value    LVEF  40-45%    Narrative    [id number]  [id number]  [id number]  [id number]^TANJA^ADAMARIS^CT     [facility]  Echocardiography Laboratory  [address] [place] [address]     Name: YESENIA ODEN  MRN: [id number]  : 1945  Study Date: 10/25/2021 09:44 AM  Age: 76 yrs  Gender: Male  Patient Location: [facility]  Reason For Study: Heart Failure  Ordering Physician: ADAMARIS AGRAWAL  Referring Physician: Natty Crook  Performed By: Moreno Watson RDCS     BSA: 1.8 m2  Height: 66 in  Weight: 159 lb  HR: 62  BP: 127/62 mmHg  ______________________________________________________________________________  Procedure  Limited Portable Echo Adult. Optison (NDC #[id number]) given intravenously.  ______________________________________________________________________________  Interpretation Summary     Left ventricular systolic function is mildly reduced.  The visual ejection fraction is 40-45%.  There is inferolateral wall akinesis.  There is moderate concentric left ventricular hypertrophy.  The left atrium is moderately dilated.  There is moderate (2+) mitral regurgitation ( ERO 0.25cm2/41 ml)  No old studies available for comparison .  ______________________________________________________________________________  Left Ventricle  The left ventricle is normal in size. There is moderate concentric left  ventricular hypertrophy. Left ventricular systolic function is  mildly reduced.  The visual ejection fraction is 40-45%. Grade II or moderate diastolic  dysfunction. There is inferolateral wall akinesis.     Right Ventricle  The right ventricle is normal in structure, function and size. There is no  mass or thrombus in the right ventricle.     Atria  The left atrium is moderately dilated. Right atrial size is normal. There is  no atrial shunt seen. The left atrial appendage is not well visualized.     Mitral Valve  There is moderate mitral annular calcification. There is moderate (2+) mitral  regurgitation. There is no mitral valve stenosis.     Tricuspid Valve  Normal tricuspid valve. The right ventricular systolic pressure is  approximated at 23.6 mmHg plus the right atrial pressure. Right ventricular  systolic pressure is normal. There is no tricuspid stenosis.     Aortic Valve  There is moderate trileaflet aortic sclerosis. There is mild (1+) aortic  regurgitation. No aortic stenosis is present.     Pulmonic Valve  Normal pulmonic valve. There is no pulmonic valvular regurgitation. There is  no pulmonic valvular stenosis.     Vessels  The aortic root is normal size. Normal size ascending aorta. Inferior vena  cava not well visualized for estimation of right atrial pressure. The  pulmonary artery is normal size.     Pericardium  The pericardium appears normal. There is no pleural effusion.     Rhythm  Sinus rhythm was noted.  ______________________________________________________________________________  MMode/2D Measurements & Calculations  IVSd: 1.6 cm     LVIDd: 5.1 cm  LVIDs: 4.4 cm  LVPWd: 1.3 cm  FS: 13.9 %  LV mass(C)d: 328.6 grams  LV mass(C)dI: 181.1 grams/m2  Ao root diam: 3.1 cm  LA dimension: 5.0 cm  asc Aorta Diam: 2.7 cm  LA/Ao: 1.6     EF(MOD-bp): 32.2 %  LA Volume (BP): 92.0 ml  LA Volume Index (BP): 50.8 ml/m2  RWT: 0.53     Doppler Measurements & Calculations  MV E max jimbo: 116.0 cm/sec  MV A max jibmo: 27.1 cm/sec  MV E/A: 4.3  MV dec time: 0.14 sec  MR ERO:  0.24 cm2  MR volume: 39.0 ml  PA acc time: 0.10 sec  TR max jimbo: 243.0 cm/sec  TR max P.6 mmHg  E/E' av.3  Lateral E/e': 19.3  Medial E/e': 23.3     ______________________________________________________________________________  Report approved by: Dr. Heraclio Mesa 10/25/2021 12:13 PM               Discharge Medications   Current Discharge Medication List      START taking these medications    Details   amiodarone (PACERONE) 200 MG tablet Take 1 tablet (200 mg) by mouth daily  Qty: 60 tablet, Refills: 0    Associated Diagnoses: NSTEMI (non-ST elevated myocardial infarction) (H)      bumetanide (BUMEX) 1 MG tablet Take 1 tablet (1 mg) by mouth daily  Qty: 60 tablet, Refills: 0    Associated Diagnoses: NSTEMI (non-ST elevated myocardial infarction) (H)         CONTINUE these medications which have NOT CHANGED    Details   acetaminophen (TYLENOL) 325 MG tablet Take 650 mg by mouth every 4 hours as needed for mild pain      aspirin 81 MG EC tablet Take 81 mg by mouth daily      clopidogrel (PLAVIX) 75 MG tablet Take 75 mg by mouth daily      furosemide (LASIX) 40 MG tablet Take 40 mg by mouth 2 times daily Take at 6 am and noon      isosorbide mononitrate (IMDUR) 60 MG 24 hr tablet Take 30 mg by mouth At Bedtime      metoprolol succinate ER (TOPROL-XL) 50 MG 24 hr tablet Take 50 mg by mouth 2 times daily      multivitamin w/minerals (THERA-VIT-M) tablet Take 1 tablet by mouth daily      nitroGLYcerin (NITROSTAT) 0.4 MG sublingual tablet Place 0.4 mg under the tongue every 5 minutes as needed for chest pain For chest pain place 1 tablet under the tongue every 5 minutes for 3 doses. If symptoms persist 5 minutes after 1st dose call 911.      pantoprazole (PROTONIX) 40 MG EC tablet Take 40 mg by mouth 2 times daily      vitamin C (ASCORBIC ACID) 1000 MG TABS Take 1,000 mg by mouth daily      Vitamin D, Cholecalciferol, 25 MCG (1000 UT) CAPS Take 2,000 Units by mouth daily         STOP taking these  medications       rosuvastatin (CRESTOR) 40 MG tablet Comments:   Reason for Stopping:             Allergies   Allergies   Allergen Reactions     Amlodipine Other (See Comments)     Worse leg swelling than when on coreg or metoprolol     Atenolol Other (See Comments)     Carvedilol Other (See Comments)     Sharp calf pain     Ciprofloxacin Other (See Comments)     Patient on metronidazole and ciprofloxacin at same time for diverticulitis.  Developed nausea, headache, loose stool.     Metoprolol Other (See Comments)     Leg pain, darkening toes (?atheroembolic)     Metronidazole Other (See Comments)     Patient on metronidazole and ciprofloxacin at same time for diverticulitis.  Developed nausea, headache, loose stool.     Morphine Other (See Comments)     Chills     Simvastatin Rash     Skin pealing

## 2021-10-28 NOTE — PROGRESS NOTES
Rec'd call from [facility] re: follow up with Dr. Fletcher on Dec. 1st at 2PM.  Patient had already discharged home.  Voice message left with patient regarding details of above appointment and location.  Confirmed with [facility] that patient will also be contacted a few days prior to appointment as a reminder.    Lupe De La Rosa RN  Care Coordinator  [facility]  [phone] (text or call)

## 2021-10-28 NOTE — CONSULTS
[facility]       Per record review patient is followed at [facility] and has f/u scheduled w/cardiology on 12/7/21.       Please call with questions.         Yady Weaver RN   [facility] RN Care Coordinator   [facility]   [phone]   CORE Clinic: Cardiomyopathy, Optimization, Rehabilitation, Education

## 2021-10-29 ENCOUNTER — PATIENT OUTREACH (OUTPATIENT)
Dept: CARE COORDINATION | Facility: CLINIC | Age: 76
End: 2021-10-29

## 2021-10-29 DIAGNOSIS — Z71.89 OTHER SPECIFIED COUNSELING: ICD-10-CM

## 2021-10-29 NOTE — PROGRESS NOTES
Clinic Care Coordination Contact  [facility]/Voicemail       Clinical Data: Care Coordinator Outreach  Outreach attempted x 1.  Left message on patient's voicemail with call back information and requested return call.  Plan:  Care Coordinator will try to reach patient again in 1-2 business days.    Ema Cruz  Care Transitions Assistant  [facility]

## 2021-10-30 LAB
ATRIAL RATE - MUSE: 75 BPM
DIASTOLIC BLOOD PRESSURE - MUSE: NORMAL MMHG
INTERPRETATION ECG - MUSE: NORMAL
P AXIS - MUSE: NORMAL DEGREES
PR INTERVAL - MUSE: NORMAL MS
QRS DURATION - MUSE: 166 MS
QT - MUSE: 424 MS
QTC - MUSE: 602 MS
R AXIS - MUSE: 25 DEGREES
SYSTOLIC BLOOD PRESSURE - MUSE: NORMAL MMHG
T AXIS - MUSE: 186 DEGREES
VENTRICULAR RATE- MUSE: 121 BPM

## 2021-10-30 NOTE — PROGRESS NOTES
Clinic Care Coordination Contact  [facility]/Voicemail       Clinical Data: Care Coordinator Outreach  Outreach attempted x 2.  Left message on patient's voicemail with call back information and requested return call.  Plan:  Care Coordinator will do no further outreaches at this time.      BISI Hammer  [phone]  [facility]

## 2021-11-03 LAB
ATRIAL RATE - MUSE: 63 BPM
DIASTOLIC BLOOD PRESSURE - MUSE: NORMAL MMHG
INTERPRETATION ECG - MUSE: NORMAL
P AXIS - MUSE: 66 DEGREES
PR INTERVAL - MUSE: 234 MS
QRS DURATION - MUSE: 118 MS
QT - MUSE: 490 MS
QTC - MUSE: 501 MS
R AXIS - MUSE: 49 DEGREES
SYSTOLIC BLOOD PRESSURE - MUSE: NORMAL MMHG
T AXIS - MUSE: 244 DEGREES
VENTRICULAR RATE- MUSE: 63 BPM

## 2022-11-01 NOTE — ED NOTES
Appears to have small skin tear with possible puncture to right lower arm, wrist area.   Bleeding controlled.  Clean gauze 4x4 & Coban applied.     used